# Patient Record
Sex: FEMALE | Race: WHITE | NOT HISPANIC OR LATINO | Employment: FULL TIME | URBAN - METROPOLITAN AREA
[De-identification: names, ages, dates, MRNs, and addresses within clinical notes are randomized per-mention and may not be internally consistent; named-entity substitution may affect disease eponyms.]

---

## 2018-11-14 ENCOUNTER — HOSPITAL ENCOUNTER (EMERGENCY)
Facility: HOSPITAL | Age: 28
Discharge: HOME | End: 2018-11-14
Attending: EMERGENCY MEDICINE
Payer: COMMERCIAL

## 2018-11-14 VITALS
TEMPERATURE: 98 F | OXYGEN SATURATION: 98 % | BODY MASS INDEX: 25.72 KG/M2 | WEIGHT: 139.75 LBS | RESPIRATION RATE: 16 BRPM | SYSTOLIC BLOOD PRESSURE: 129 MMHG | DIASTOLIC BLOOD PRESSURE: 82 MMHG | HEIGHT: 62 IN | HEART RATE: 81 BPM
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
## 2018-11-14 PROCEDURE — 99283 EMERGENCY DEPT VISIT LOW MDM: CPT

## 2018-11-14 RX ORDER — NAPROXEN 375 MG/1
375 TABLET ORAL 2 TIMES DAILY WITH MEALS
Qty: 14 TABLET | Refills: 0 | Status: SHIPPED | OUTPATIENT
Start: 2018-11-14 | End: 2021-11-19
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
## 2022-08-24 ENCOUNTER — OFFICE VISIT (OUTPATIENT)
Dept: URGENT CARE | Facility: CLINIC | Age: 32
End: 2022-08-24
Payer: COMMERCIAL

## 2022-08-24 ENCOUNTER — HOSPITAL ENCOUNTER (OUTPATIENT)
Dept: RADIOLOGY | Facility: CLINIC | Age: 32
Discharge: HOME | End: 2022-08-24
Attending: PHYSICIAN ASSISTANT
Payer: COMMERCIAL

## 2022-08-24 VITALS
OXYGEN SATURATION: 99 % | HEART RATE: 81 BPM | TEMPERATURE: 99 F | SYSTOLIC BLOOD PRESSURE: 116 MMHG | HEIGHT: 62 IN | RESPIRATION RATE: 18 BRPM | WEIGHT: 148 LBS | BODY MASS INDEX: 27.23 KG/M2 | DIASTOLIC BLOOD PRESSURE: 78 MMHG

## 2022-08-24 DIAGNOSIS — K59.01 SLOW TRANSIT CONSTIPATION: ICD-10-CM

## 2022-08-24 DIAGNOSIS — R10.9 ABDOMINAL DISCOMFORT: Primary | ICD-10-CM

## 2022-08-24 LAB
B-HCG UR QL: NEGATIVE
BILIRUB UR QL STRIP: NEGATIVE
CTP QC/QA: YES
GLUCOSE UR QL STRIP: NEGATIVE
KETONES UR QL STRIP: NEGATIVE
LEUKOCYTE ESTERASE UR QL STRIP: NEGATIVE
PH, POC UA: 6
POC BLOOD, URINE: POSITIVE
POC NITRATES, URINE: NEGATIVE
PROT UR QL STRIP: NEGATIVE
SP GR UR STRIP: 1 (ref 1–1.03)
UROBILINOGEN UR STRIP-ACNC: NORMAL (ref 0.1–1.1)

## 2022-08-24 PROCEDURE — 3078F DIAST BP <80 MM HG: CPT | Mod: CPTII,S$GLB,, | Performed by: PHYSICIAN ASSISTANT

## 2022-08-24 PROCEDURE — 3008F BODY MASS INDEX DOCD: CPT | Mod: CPTII,S$GLB,, | Performed by: PHYSICIAN ASSISTANT

## 2022-08-24 PROCEDURE — 1159F MED LIST DOCD IN RCRD: CPT | Mod: CPTII,S$GLB,, | Performed by: PHYSICIAN ASSISTANT

## 2022-08-24 PROCEDURE — 81025 POCT URINE PREGNANCY: ICD-10-PCS | Mod: S$GLB,,, | Performed by: PHYSICIAN ASSISTANT

## 2022-08-24 PROCEDURE — 99214 OFFICE O/P EST MOD 30 MIN: CPT | Mod: S$GLB,,, | Performed by: PHYSICIAN ASSISTANT

## 2022-08-24 PROCEDURE — 1160F PR REVIEW ALL MEDS BY PRESCRIBER/CLIN PHARMACIST DOCUMENTED: ICD-10-PCS | Mod: CPTII,S$GLB,, | Performed by: PHYSICIAN ASSISTANT

## 2022-08-24 PROCEDURE — 3008F PR BODY MASS INDEX (BMI) DOCUMENTED: ICD-10-PCS | Mod: CPTII,S$GLB,, | Performed by: PHYSICIAN ASSISTANT

## 2022-08-24 PROCEDURE — 3074F PR MOST RECENT SYSTOLIC BLOOD PRESSURE < 130 MM HG: ICD-10-PCS | Mod: CPTII,S$GLB,, | Performed by: PHYSICIAN ASSISTANT

## 2022-08-24 PROCEDURE — 81003 URINALYSIS AUTO W/O SCOPE: CPT | Mod: QW,S$GLB,, | Performed by: PHYSICIAN ASSISTANT

## 2022-08-24 PROCEDURE — 1159F PR MEDICATION LIST DOCUMENTED IN MEDICAL RECORD: ICD-10-PCS | Mod: CPTII,S$GLB,, | Performed by: PHYSICIAN ASSISTANT

## 2022-08-24 PROCEDURE — 74019 RADEX ABDOMEN 2 VIEWS: CPT | Mod: S$GLB,,, | Performed by: RADIOLOGY

## 2022-08-24 PROCEDURE — 1160F RVW MEDS BY RX/DR IN RCRD: CPT | Mod: CPTII,S$GLB,, | Performed by: PHYSICIAN ASSISTANT

## 2022-08-24 PROCEDURE — 74019 XR ABDOMEN FLAT AND ERECT: ICD-10-PCS | Mod: S$GLB,,, | Performed by: RADIOLOGY

## 2022-08-24 PROCEDURE — 3078F PR MOST RECENT DIASTOLIC BLOOD PRESSURE < 80 MM HG: ICD-10-PCS | Mod: CPTII,S$GLB,, | Performed by: PHYSICIAN ASSISTANT

## 2022-08-24 PROCEDURE — 3074F SYST BP LT 130 MM HG: CPT | Mod: CPTII,S$GLB,, | Performed by: PHYSICIAN ASSISTANT

## 2022-08-24 PROCEDURE — 81003 POCT URINALYSIS, DIPSTICK, AUTOMATED, W/O SCOPE: ICD-10-PCS | Mod: QW,S$GLB,, | Performed by: PHYSICIAN ASSISTANT

## 2022-08-24 PROCEDURE — 81025 URINE PREGNANCY TEST: CPT | Mod: S$GLB,,, | Performed by: PHYSICIAN ASSISTANT

## 2022-08-24 PROCEDURE — 99214 PR OFFICE/OUTPT VISIT, EST, LEVL IV, 30-39 MIN: ICD-10-PCS | Mod: S$GLB,,, | Performed by: PHYSICIAN ASSISTANT

## 2022-08-24 NOTE — PROGRESS NOTES
"Subjective:       Patient ID: Bessy Geller is a 32 y.o. female.    Vitals:  height is 5' 2" (1.575 m) and weight is 67.1 kg (148 lb). Her tympanic temperature is 98.7 °F (37.1 °C). Her blood pressure is 116/78 and her pulse is 81. Her respiration is 18 and oxygen saturation is 99%.     Chief Complaint: Abdominal Pain    Patient C/o soreness on right side of navel     Other  This is a new problem. Episode onset: saturday  The problem has been unchanged. Associated symptoms include abdominal pain. Pertinent negatives include no anorexia, arthralgias, change in bowel habit, chest pain, chills, congestion, coughing, diaphoresis, fatigue, fever, headaches, joint swelling, myalgias, nausea, neck pain, numbness, rash, sore throat, swollen glands, urinary symptoms, vertigo, visual change, vomiting or weakness. Nothing aggravates the symptoms. She has tried nothing for the symptoms. The treatment provided no relief.       Constitution: Negative for chills, sweating, fatigue and fever.   HENT: Negative for congestion and sore throat.    Neck: Negative for neck pain.   Cardiovascular: Negative for chest pain.   Respiratory: Negative for cough.    Gastrointestinal: Positive for abdominal pain. Negative for nausea and vomiting.   Musculoskeletal: Negative for joint pain, joint swelling and muscle ache.   Skin: Negative for rash.   Neurological: Negative for history of vertigo, headaches and numbness.       Objective:       Vitals:    08/24/22 1243   BP: 116/78   Pulse: 81   Resp: 18   Temp: 98.7 °F (37.1 °C)   TempSrc: Tympanic   SpO2: 99%   Weight: 67.1 kg (148 lb)   Height: 5' 2" (1.575 m)       Physical Exam   Constitutional: She is oriented to person, place, and time. She appears well-developed.   HENT:   Head: Normocephalic and atraumatic.   Ears:   Right Ear: External ear normal.   Left Ear: External ear normal.   Nose: Nose normal.   Mouth/Throat: Mucous membranes are normal.   Eyes: Conjunctivae and lids are " normal.   Neck: Trachea normal. Neck supple.   Cardiovascular: Normal rate, regular rhythm and normal heart sounds.   Pulmonary/Chest: Effort normal and breath sounds normal. No respiratory distress.   Abdominal: Normal appearance and bowel sounds are normal. She exhibits no distension, no fluid wave, no abdominal bruit, no pulsatile midline mass and no mass. Soft. No signs of injury. There is abdominal tenderness in the periumbilical area. There is no left CVA tenderness and no right CVA tenderness. No hernia.     Musculoskeletal: Normal range of motion.         General: Normal range of motion.   Neurological: She is alert and oriented to person, place, and time. She has normal strength.   Skin: Skin is warm, dry, intact, not diaphoretic and not pale.   Psychiatric: Her speech is normal and behavior is normal. Judgment and thought content normal.   Nursing note and vitals reviewed.        Assessment:       1. Abdominal discomfort    2. Slow transit constipation        Results for orders placed or performed in visit on 08/24/22   POCT Urinalysis, Dipstick, Automated, W/O Scope   Result Value Ref Range    POC Blood, Urine Positive (A) Negative    POC Bilirubin, Urine Negative Negative    POC Urobilinogen, Urine Normal 0.1 - 1.1    POC Ketones, Urine Negative Negative    POC Protein, Urine Negative Negative    POC Nitrates, Urine Negative Negative    POC Glucose, Urine Negative Negative    pH, UA 6.0     POC Specific Gravity, Urine 1.005 1.003 - 1.029    POC Leukocytes, Urine Negative Negative   POCT urine pregnancy   Result Value Ref Range    POC Preg Test, Ur Negative Negative     Acceptable Yes      X-Ray Abdomen Flat And Erect    Result Date: 8/24/2022  EXAMINATION: XR ABDOMEN FLAT AND ERECT CLINICAL HISTORY: periumbilical pain to the right;Unspecified abdominal pain TECHNIQUE: Flat and erect AP views of the abdomen were performed. COMPARISON: None FINDINGS: Bowel gas pattern is nonobstructive.   Moderate colonic fecal material present.  No suspicious mass-effect or calcifications present.  Osseous structures demonstrate no acute abnormality.  Mild scoliosis.  Bilateral os acetabuli. Lung bases clear.  IUD present     Constipation Electronically signed by: Brayan Baltazar MD Date:    08/24/2022 Time:    13:53    Plan:         Abdominal discomfort  -     POCT Urinalysis, Dipstick, Automated, W/O Scope  -     POCT urine pregnancy  -     X-Ray Abdomen Flat And Erect; Future; Expected date: 08/24/2022    Slow transit constipation                 Patient Instructions   Abdominal x-ray:Bowel gas pattern is nonobstructive.  Moderate colonic fecal material present.  No suspicious mass-effect or calcifications present.  Osseous structures demonstrate no acute abnormality.  Mild scoliosis. Lung bases clear.  IUD present       CONSTIPATION TIPS:    - you can use the phosphates enema and bisacodyl suppository (laxatives) as directed 1st to clear any blockage from underneath.    - After you having good bowel movement, you can use over-the-counter liquid magnesium citrate (laxative) if needed for continued constipation.    - You can also take polyethylene glycol (david lax; stool softener) daily as directed to help with constipation. This will help to regulate bowel pattern.    - Make sure to drink lots of water while taking any of the above medications.                      Gas and Bloating  Although the mention of intestinal gas problems, such as belching, flatulence, bloating and .gas pains. often elicits some degree of amusement, all of us have gas in our  intestinal tract and must expel it in some way. Some individuals are very sensitive to the effects of gas collections in the stomach and intestinal tract and may develop  significant discomfort. If such complaints are troublesome and persistent and do not respond to simple measures, such as change in diet, a visit to your doctor could be helpful.  Where does the  gas that we belch  or burp come from?  The gas brought back by belching comes entirely from  swallowed air. We all swallow some air when eating food and  drinking liquids. Most of the gas mixes with the stomach  content and either enters into the small intestine or is  belched back. The air that enters the small intestine is either  absorbed or it may continue through to the large intestine  and is then passed rectally. Individuals may swallow more air  (and thus increase stomach gas) if they have a post-nasal  drip, chew gum, have poorly fitting dentures, suck on hard  candies or smoke tobacco. Drinking carbonated beverages  (soda or beer) or eating rapidly can also increase stomach  gas.  What causes repetitive belching or burping?  Some people have episodes of repeated belching. Since  belched gas comes from swallowed air, these individuals are  usually unaware that they caused the problem by swallowing  air into the esophagus and bringing it back rapidly. Often,  the habit can be broken if the person is made aware of the air  swallowing behavior.  What foods cause increased flatus  passage?  The food we choose to eat can influence the amount of  gas passed rectally. Although most of our food intake is  absorbed in the small intestine, some foods, such as  cauliflower, broccoli, cabbage, baked beans, and bran are  incompletely digested. They are then broken down by bacteria in the large intestine, causing the formation of gas. A high roughage diet is important to promote bowel  regularity, but excessive roughage or fiber may lead to  bloating and increased flatulence. When increasing the  amount of fiber in your diet, do so gradually, allowing your  intestinal tract time to adjust. Milk sugar (lactose) requires an intestinal enzyme  (lactase) for digestion. When individuals lack this enzyme  the lactose in milk and other dairy products enters the large  intestine where the lactose is broken down by  bacteria,  producing gas. Although milk is an excellent source of  protein and calcium, many adults experience abdominal  bloating, gas and diarrhea after consuming milk sugar.  Persons from [place] are often extremely intolerant  to the smallest quantity of dairy products.  Everyone passes some rectal gas, although the volume  of gas is different each day. Much of the flatus comes from  the nitrogen found in the air one swallows. The remainder  of the flatus volume is the result of carbohydrates which are  not absorbed in the small intestine and are broken down by  bacteria in the large intestine. Therefore, the amount of  flatus represents a combination of swallowed air and poorly  absorbed carbohydrates. The unpleasant order of flatus is  due to other gases, such as hydrogen sulfide, which is  produced by the bacteria.  How can the volume of flatus be reduced?  In addition to the gas-forming foods cited above, some  diet chewing gum and hard candies use sorbitol or fructose  as sweeteners. These sugars can lead to excess gas  production and should be avoided. Elimination of dairy  products or the use of lactase-added milk can be helpful for  those with lactase deficiency.  Where do I feel gas pains?  Individuals with irritable bowel problems (crampy pain  and/or bowel irregularity) are often sensitive to excess  intestinal gas. A common symptom is generalized  abdominal cramping, sometimes relieved by passing flatus.  If the gas accumulates in the right upper abdomen, the pain  may radiate up intconfused with gallbladder disease. If the gas accumulates in  the left upper abdomen, the pain may radiate into the left side  of the chest and could mimic heart disease. If gas  accumulates in the stomach, the upper abdominal pressure  pain could radiate up to the lower chest and raise concern  about a heart disorder.  Is there treatment for gas pains?  Your physician may wish to obtain tests to be  confident  that recurrent .gas pains. are not the result of some other  disorder. If the tests are normal, a diet designed to reduce  both air swallowing and the ingestion of gas forming foods  would be helpful. Anti-spasmodic medications may relieve  crampy discomfort, but these can have side effects on the  eyes, plus bladder and bowel function.  What causes abdominal distension (bloating)?  Many individuals complain of abdominal distension  which increases during the day and is most uncomfortable  after the evening meal. Often distension is believed to be  caused by the build-up of intestinal gas; however, there are  other considerations. The tone of the rectus muscles (the  muscles which support the abdominal wall and run along the  length of the abdomen on either side of the navel) may be  decreased due to the stretching of the abdominal wall in  women who have had one or more pregnancies. If these  muscles have become thinned, the abdomen may distend as  food (and gas) moves through the intestine. This is most  noticed after the evening meal. This explanation for  distension (bloating) is most likely if the uncomfortable  feeling is absent when the individual is lying down (you don.t  need the rectus muscle for a .flat. abdomen when lying  down) but is apparent when standing or sitting erect. There  is no effective medical therapy for this type of abdominal  bloating but exercise directed toward strengthening the  abdominal muscles may be helpful, particularly in younger  women.  When should individuals with gaseous symptoms consult a  Physician?  Individuals with a long history of occasional  gaseousness and abdominal discomfort need not seek  medical attention. A change in the location of abdominal  pain, significant increase in the frequency or severity of  symptoms, or onset of new symptoms in individuals over the  age of 40 are some of the reasons to see your doctor.o the right lower chest and could be    What over-the-counter drugs provide relief for gaseous  symptoms?  Despite the many commercials and advertiseme  medications which reduce gas pains and bloating, very few  have any proven scientific value. Simethicone, a common  additive to antacid preparations, shows some benefits when  being tested in a lab, but many individuals feel little relief.  Several scientific studies have found some benefit from  activated charcoal preparations in gassy or flatulent  individuals, but other studies have failed to show symptom  Improvement.  10 Steps to Decrease Symptoms of Intestinal Gas  1. Develop a regular routine of diet, exercise, and rest.  2. Correct bad habits:  Chew food thoroughly  Eat slowly and leisurely in a quiet atmosphere  Avoid washing solids down with a beverage  Avoid gulping and sipping liquids  Avoid drinking out of small mouthed bottles or straws  Avoid drinking from water fountains  Avoid carbonated beverages.sodas and beer  Eliminate pipe, cigar, and cigarette smoking  Avoid gum chewing and sucking hard candy  Check dentures for proper fit  Attempt to be aware of and avoid deep sighing  3. Do not attempt to induce belching.  4. Do not overload the stomach at any one meal.  5. Avoid gaseous vegetables: navy beans, cabbage, brussel  sprouts, cauliflower, broccoli, turnips, cucumbers, radishes,  onions, melons, and excess raw fruit and vegetables.  6. Avoid foods with air whipped into them.souffles, sponge cake,  milk shakes.  7. Avoid long-term or frequent intermittent use of medications  intended for relief of cold symptoms. cough, nasal congestion,  post nasal discharge.  8. Avoid tight fitting garments, girdles, and belts.  9. Do not lie down or sit in a slumped position immediately after  eating.  10. Take a leisurely stroll after meals.      - You must understand that you have received an Urgent Care treatment only and that you may be released before all of your medical problems are known or  treated.   - You, the patient, will arrange for follow up care as instructed with your primary care provider or recommended specialist.   - If your condition worsens or fails to improve we recommend that you receive another evaluation at the ER immediately or contact your PCP to discuss your concerns, or return here.   - Please do not drive or make any important decisions for 24 hours if you have received any pain medications, sedatives or mood altering drugs during your visit.    Disclaimer: This document was drafted with the use of a voice recognition device and is likely to have sound alike errors.

## 2022-08-24 NOTE — PATIENT INSTRUCTIONS
Abdominal x-ray:Bowel gas pattern is nonobstructive.  Moderate colonic fecal material present.  No suspicious mass-effect or calcifications present.  Osseous structures demonstrate no acute abnormality.  Mild scoliosis. Lung bases clear.  IUD present       CONSTIPATION TIPS:    - you can use the phosphates enema and bisacodyl suppository (laxatives) as directed 1st to clear any blockage from underneath.    - After you having good bowel movement, you can use over-the-counter liquid magnesium citrate (laxative) if needed for continued constipation.    - You can also take polyethylene glycol (david lax; stool softener) daily as directed to help with constipation. This will help to regulate bowel pattern.    - Make sure to drink lots of water while taking any of the above medications.                      Gas and Bloating  Although the mention of intestinal gas problems, such as belching, flatulence, bloating and .gas pains. often elicits some degree of amusement, all of us have gas in our  intestinal tract and must expel it in some way. Some individuals are very sensitive to the effects of gas collections in the stomach and intestinal tract and may develop  significant discomfort. If such complaints are troublesome and persistent and do not respond to simple measures, such as change in diet, a visit to your doctor could be helpful.  Where does the gas that we belch  or burp come from?  The gas brought back by belching comes entirely from  swallowed air. We all swallow some air when eating food and  drinking liquids. Most of the gas mixes with the stomach  content and either enters into the small intestine or is  belched back. The air that enters the small intestine is either  absorbed or it may continue through to the large intestine  and is then passed rectally. Individuals may swallow more air  (and thus increase stomach gas) if they have a post-nasal  drip, chew gum, have poorly fitting dentures, suck on  hard  candies or smoke tobacco. Drinking carbonated beverages  (soda or beer) or eating rapidly can also increase stomach  gas.  What causes repetitive belching or burping?  Some people have episodes of repeated belching. Since  belched gas comes from swallowed air, these individuals are  usually unaware that they caused the problem by swallowing  air into the esophagus and bringing it back rapidly. Often,  the habit can be broken if the person is made aware of the air  swallowing behavior.  What foods cause increased flatus  passage?  The food we choose to eat can influence the amount of  gas passed rectally. Although most of our food intake is  absorbed in the small intestine, some foods, such as  cauliflower, broccoli, cabbage, baked beans, and bran are  incompletely digested. They are then broken down by bacteria in the large intestine, causing the formation of gas. A high roughage diet is important to promote bowel  regularity, but excessive roughage or fiber may lead to  bloating and increased flatulence. When increasing the  amount of fiber in your diet, do so gradually, allowing your  intestinal tract time to adjust. Milk sugar (lactose) requires an intestinal enzyme  (lactase) for digestion. When individuals lack this enzyme  the lactose in milk and other dairy products enters the large  intestine where the lactose is broken down by bacteria,  producing gas. Although milk is an excellent source of  protein and calcium, many adults experience abdominal  bloating, gas and diarrhea after consuming milk sugar.  Persons from [place] are often extremely intolerant  to the smallest quantity of dairy products.  Everyone passes some rectal gas, although the volume  of gas is different each day. Much of the flatus comes from  the nitrogen found in the air one swallows. The remainder  of the flatus volume is the result of carbohydrates which are  not absorbed in the small intestine and are broken down  by  bacteria in the large intestine. Therefore, the amount of  flatus represents a combination of swallowed air and poorly  absorbed carbohydrates. The unpleasant order of flatus is  due to other gases, such as hydrogen sulfide, which is  produced by the bacteria.  How can the volume of flatus be reduced?  In addition to the gas-forming foods cited above, some  diet chewing gum and hard candies use sorbitol or fructose  as sweeteners. These sugars can lead to excess gas  production and should be avoided. Elimination of dairy  products or the use of lactase-added milk can be helpful for  those with lactase deficiency.  Where do I feel gas pains?  Individuals with irritable bowel problems (crampy pain  and/or bowel irregularity) are often sensitive to excess  intestinal gas. A common symptom is generalized  abdominal cramping, sometimes relieved by passing flatus.  If the gas accumulates in the right upper abdomen, the pain  may radiate up intconfused with gallbladder disease. If the gas accumulates in  the left upper abdomen, the pain may radiate into the left side  of the chest and could mimic heart disease. If gas  accumulates in the stomach, the upper abdominal pressure  pain could radiate up to the lower chest and raise concern  about a heart disorder.  Is there treatment for gas pains?  Your physician may wish to obtain tests to be confident  that recurrent .gas pains. are not the result of some other  disorder. If the tests are normal, a diet designed to reduce  both air swallowing and the ingestion of gas forming foods  would be helpful. Anti-spasmodic medications may relieve  crampy discomfort, but these can have side effects on the  eyes, plus bladder and bowel function.  What causes abdominal distension (bloating)?  Many individuals complain of abdominal distension  which increases during the day and is most uncomfortable  after the evening meal. Often distension is believed to be  caused by the build-up of  intestinal gas; however, there are  other considerations. The tone of the rectus muscles (the  muscles which support the abdominal wall and run along the  length of the abdomen on either side of the navel) may be  decreased due to the stretching of the abdominal wall in  women who have had one or more pregnancies. If these  muscles have become thinned, the abdomen may distend as  food (and gas) moves through the intestine. This is most  noticed after the evening meal. This explanation for  distension (bloating) is most likely if the uncomfortable  feeling is absent when the individual is lying down (you don.t  need the rectus muscle for a .flat. abdomen when lying  down) but is apparent when standing or sitting erect. There  is no effective medical therapy for this type of abdominal  bloating but exercise directed toward strengthening the  abdominal muscles may be helpful, particularly in younger  women.  When should individuals with gaseous symptoms consult a  Physician?  Individuals with a long history of occasional  gaseousness and abdominal discomfort need not seek  medical attention. A change in the location of abdominal  pain, significant increase in the frequency or severity of  symptoms, or onset of new symptoms in individuals over the  age of 40 are some of the reasons to see your doctor.o the right lower chest and could be   What over-the-counter drugs provide relief for gaseous  symptoms?  Despite the many commercials and advertiseme  medications which reduce gas pains and bloating, very few  have any proven scientific value. Simethicone, a common  additive to antacid preparations, shows some benefits when  being tested in a lab, but many individuals feel little relief.  Several scientific studies have found some benefit from  activated charcoal preparations in gassy or flatulent  individuals, but other studies have failed to show symptom  Improvement.  10 Steps to Decrease Symptoms of Intestinal Gas  1.  Develop a regular routine of diet, exercise, and rest.  2. Correct bad habits:  Chew food thoroughly  Eat slowly and leisurely in a quiet atmosphere  Avoid washing solids down with a beverage  Avoid gulping and sipping liquids  Avoid drinking out of small mouthed bottles or straws  Avoid drinking from water fountains  Avoid carbonated beverages.sodas and beer  Eliminate pipe, cigar, and cigarette smoking  Avoid gum chewing and sucking hard candy  Check dentures for proper fit  Attempt to be aware of and avoid deep sighing  3. Do not attempt to induce belching.  4. Do not overload the stomach at any one meal.  5. Avoid gaseous vegetables: navy beans, cabbage, brussel  sprouts, cauliflower, broccoli, turnips, cucumbers, radishes,  onions, melons, and excess raw fruit and vegetables.  6. Avoid foods with air whipped into them.souffles, sponge cake,  milk shakes.  7. Avoid long-term or frequent intermittent use of medications  intended for relief of cold symptoms. cough, nasal congestion,  post nasal discharge.  8. Avoid tight fitting garments, girdles, and belts.  9. Do not lie down or sit in a slumped position immediately after  eating.  10. Take a leisurely stroll after meals.      - You must understand that you have received an Urgent Care treatment only and that you may be released before all of your medical problems are known or treated.   - You, the patient, will arrange for follow up care as instructed with your primary care provider or recommended specialist.   - If your condition worsens or fails to improve we recommend that you receive another evaluation at the ER immediately or contact your PCP to discuss your concerns, or return here.   - Please do not drive or make any important decisions for 24 hours if you have received any pain medications, sedatives or mood altering drugs during your visit.    Disclaimer: This document was drafted with the use of a voice recognition device and is likely to have sound  alike errors.

## 2022-08-24 NOTE — LETTER
August 24, 2022      [facility]  [address], [address]  [place] [address]  Phone: [phone]       Patient: Bessy Geller   YOB: 1990  Date of Visit: 08/24/2022    To Whom It May Concern:    Moraima Geller  was at [facility] on 08/24/2022. The patient may return to work/school on today with no restrictions. If you have any questions or concerns, or if I can be of further assistance, please do not hesitate to contact me.    Sincerely,    Eileen Trejo PA-C

## 2022-09-19 ENCOUNTER — OFFICE VISIT (OUTPATIENT)
Dept: URGENT CARE | Facility: CLINIC | Age: 32
End: 2022-09-19
Payer: COMMERCIAL

## 2022-09-19 VITALS
RESPIRATION RATE: 16 BRPM | BODY MASS INDEX: 27.6 KG/M2 | DIASTOLIC BLOOD PRESSURE: 78 MMHG | HEIGHT: 62 IN | WEIGHT: 150 LBS | SYSTOLIC BLOOD PRESSURE: 132 MMHG | HEART RATE: 76 BPM | OXYGEN SATURATION: 99 % | TEMPERATURE: 98 F

## 2022-09-19 DIAGNOSIS — R35.0 URINARY FREQUENCY: Primary | ICD-10-CM

## 2022-09-19 DIAGNOSIS — R30.0 DYSURIA: ICD-10-CM

## 2022-09-19 LAB
BILIRUB UR QL STRIP: NEGATIVE
GLUCOSE UR QL STRIP: NEGATIVE
KETONES UR QL STRIP: NEGATIVE
LEUKOCYTE ESTERASE UR QL STRIP: NEGATIVE
PH, POC UA: 8
POC BLOOD, URINE: POSITIVE
POC NITRATES, URINE: NEGATIVE
PROT UR QL STRIP: NEGATIVE
SP GR UR STRIP: 1 (ref 1–1.03)
UROBILINOGEN UR STRIP-ACNC: ABNORMAL (ref 0.1–1.1)

## 2022-09-19 PROCEDURE — 3008F BODY MASS INDEX DOCD: CPT | Mod: CPTII,S$GLB,, | Performed by: PHYSICIAN ASSISTANT

## 2022-09-19 PROCEDURE — 1160F RVW MEDS BY RX/DR IN RCRD: CPT | Mod: CPTII,S$GLB,, | Performed by: PHYSICIAN ASSISTANT

## 2022-09-19 PROCEDURE — 1159F PR MEDICATION LIST DOCUMENTED IN MEDICAL RECORD: ICD-10-PCS | Mod: CPTII,S$GLB,, | Performed by: PHYSICIAN ASSISTANT

## 2022-09-19 PROCEDURE — 1159F MED LIST DOCD IN RCRD: CPT | Mod: CPTII,S$GLB,, | Performed by: PHYSICIAN ASSISTANT

## 2022-09-19 PROCEDURE — 99213 PR OFFICE/OUTPT VISIT, EST, LEVL III, 20-29 MIN: ICD-10-PCS | Mod: S$GLB,,, | Performed by: PHYSICIAN ASSISTANT

## 2022-09-19 PROCEDURE — 81003 URINALYSIS AUTO W/O SCOPE: CPT | Mod: QW,S$GLB,, | Performed by: PHYSICIAN ASSISTANT

## 2022-09-19 PROCEDURE — 3075F SYST BP GE 130 - 139MM HG: CPT | Mod: CPTII,S$GLB,, | Performed by: PHYSICIAN ASSISTANT

## 2022-09-19 PROCEDURE — 81003 POCT URINALYSIS, DIPSTICK, AUTOMATED, W/O SCOPE: ICD-10-PCS | Mod: QW,S$GLB,, | Performed by: PHYSICIAN ASSISTANT

## 2022-09-19 PROCEDURE — 3078F PR MOST RECENT DIASTOLIC BLOOD PRESSURE < 80 MM HG: ICD-10-PCS | Mod: CPTII,S$GLB,, | Performed by: PHYSICIAN ASSISTANT

## 2022-09-19 PROCEDURE — 99213 OFFICE O/P EST LOW 20 MIN: CPT | Mod: S$GLB,,, | Performed by: PHYSICIAN ASSISTANT

## 2022-09-19 PROCEDURE — 3075F PR MOST RECENT SYSTOLIC BLOOD PRESS GE 130-139MM HG: ICD-10-PCS | Mod: CPTII,S$GLB,, | Performed by: PHYSICIAN ASSISTANT

## 2022-09-19 PROCEDURE — 3078F DIAST BP <80 MM HG: CPT | Mod: CPTII,S$GLB,, | Performed by: PHYSICIAN ASSISTANT

## 2022-09-19 PROCEDURE — 3008F PR BODY MASS INDEX (BMI) DOCUMENTED: ICD-10-PCS | Mod: CPTII,S$GLB,, | Performed by: PHYSICIAN ASSISTANT

## 2022-09-19 PROCEDURE — 1160F PR REVIEW ALL MEDS BY PRESCRIBER/CLIN PHARMACIST DOCUMENTED: ICD-10-PCS | Mod: CPTII,S$GLB,, | Performed by: PHYSICIAN ASSISTANT

## 2022-09-19 PROCEDURE — 87086 URINE CULTURE/COLONY COUNT: CPT | Performed by: PHYSICIAN ASSISTANT

## 2022-09-19 RX ORDER — GALCANEZUMAB 120 MG/ML
INJECTION, SOLUTION SUBCUTANEOUS
COMMUNITY
Start: 2022-09-12 | End: 2022-12-02

## 2022-09-19 NOTE — PROGRESS NOTES
"Subjective:       Patient ID: Bessy Geller is a 32 y.o. female.    Vitals:  height is 5' 2" (1.575 m) and weight is 68 kg (150 lb). Her tympanic temperature is 98.3 °F (36.8 °C). Her blood pressure is 132/78 and her pulse is 76. Her respiration is 16 and oxygen saturation is 99%.     Chief Complaint: Urinary Tract Infection    Pt c/o UTI symptoms.  Virtual visit-prescribed Ciprofloxacin which she finished on Saturday.  Pt c/o urinary frequency, dysuria, pressure, and urinary hesitancy that have continued.  Denies fever, N/V, flank pain, or abdominal pain.  Patient currently on her menstrual cycle.    Urinary Tract Infection   This is a new problem. The current episode started 1 to 4 weeks ago (2 weeks). The problem has been gradually worsening. The quality of the pain is described as burning and aching (pressure). The pain is at a severity of 5/10. The pain is moderate. There has been no fever. She is Sexually active. There is No history of pyelonephritis. Associated symptoms include frequency, hesitancy and urgency. Pertinent negatives include no behavior changes, chills, discharge, flank pain, hematuria, nausea, possible pregnancy, sweats, vomiting, weight loss, bubble bath use, constipation, rash or withholding. She has tried antibiotics for the symptoms. The treatment provided no relief. There is no history of catheterization, diabetes insipidus, diabetes mellitus, genitourinary reflux, hypertension, kidney stones, recurrent UTIs, a single kidney, STD, urinary stasis or a urological procedure.     Constitution: Negative for chills.   Gastrointestinal:  Negative for nausea, vomiting and constipation.   Genitourinary:  Positive for frequency and urgency. Negative for flank pain and hematuria.   Skin:  Negative for rash.     Objective:      Physical Exam   Constitutional: She is oriented to person, place, and time. She appears well-developed.   HENT:   Head: Normocephalic and atraumatic.   Ears:   Right " Ear: External ear normal.   Left Ear: External ear normal.   Mouth/Throat: Mucous membranes are normal.   Eyes: Conjunctivae and lids are normal.   Neck: Trachea normal. Neck supple.   Cardiovascular: Normal rate, regular rhythm and normal heart sounds.   Pulmonary/Chest: Effort normal and breath sounds normal. No respiratory distress.   Abdominal: Normal appearance and bowel sounds are normal. She exhibits no distension and no mass. Soft. flat abdomen There is no abdominal tenderness. There is no left CVA tenderness and no right CVA tenderness.   Musculoskeletal: Normal range of motion.         General: Normal range of motion.   Neurological: She is alert and oriented to person, place, and time. She has normal strength.   Skin: Skin is warm, dry, intact, not diaphoretic and not pale.   Psychiatric: Her speech is normal and behavior is normal. Judgment and thought content normal.   Nursing note and vitals reviewed.      Assessment:       1. Urinary frequency    2. Dysuria          Plan:         Urinary frequency  -     POCT Urinalysis, Dipstick, Automated, W/O Scope  -     Urine culture    Dysuria  -     Urine culture       Results for orders placed or performed in visit on 09/19/22   POCT Urinalysis, Dipstick, Automated, W/O Scope   Result Value Ref Range    POC Blood, Urine Positive (A) Negative    POC Bilirubin, Urine Negative Negative    POC Urobilinogen, Urine norm 0.1 - 1.1    POC Ketones, Urine Negative Negative    POC Protein, Urine Negative Negative    POC Nitrates, Urine Negative Negative    POC Glucose, Urine Negative Negative    pH, UA 8.0     POC Specific Gravity, Urine 1.005 (A) 1.003 - 1.029    POC Leukocytes, Urine Negative Negative       Will send urine for culture.  WE will call you with results and advise if you need to start antibiotics at that time.  Drink plenty of fluid.  Good hygiene practices encouraged.  Avoid citrus fruits.  RTC or go to the ER with new or worsening symptoms.

## 2022-09-21 ENCOUNTER — TELEPHONE (OUTPATIENT)
Dept: URGENT CARE | Facility: CLINIC | Age: 32
End: 2022-09-21
Payer: COMMERCIAL

## 2022-09-21 LAB — BACTERIA UR CULT: NO GROWTH

## 2022-09-21 NOTE — TELEPHONE ENCOUNTER
Patient notified of results below and no need for antibiotics at this time.    Reminded to follow-up with PCP, urology, or OBGYN as needed.    Results for orders placed or performed in visit on 09/19/22   Urine culture    Specimen: Urine, Clean Catch   Result Value Ref Range    Urine Culture, Routine No growth    POCT Urinalysis, Dipstick, Automated, W/O Scope   Result Value Ref Range    POC Blood, Urine Positive (A) Negative    POC Bilirubin, Urine Negative Negative    POC Urobilinogen, Urine norm 0.1 - 1.1    POC Ketones, Urine Negative Negative    POC Protein, Urine Negative Negative    POC Nitrates, Urine Negative Negative    POC Glucose, Urine Negative Negative    pH, UA 8.0     POC Specific Gravity, Urine 1.005 (A) 1.003 - 1.029    POC Leukocytes, Urine Negative Negative

## 2023-08-10 ENCOUNTER — OFFICE VISIT (OUTPATIENT)
Dept: URGENT CARE | Facility: CLINIC | Age: 33
End: 2023-08-10
Payer: COMMERCIAL

## 2023-08-10 VITALS
HEIGHT: 62 IN | OXYGEN SATURATION: 97 % | BODY MASS INDEX: 26.87 KG/M2 | TEMPERATURE: 98 F | HEART RATE: 76 BPM | RESPIRATION RATE: 16 BRPM | DIASTOLIC BLOOD PRESSURE: 76 MMHG | SYSTOLIC BLOOD PRESSURE: 116 MMHG | WEIGHT: 146 LBS

## 2023-08-10 DIAGNOSIS — J02.9 ACUTE PHARYNGITIS, UNSPECIFIED ETIOLOGY: Primary | ICD-10-CM

## 2023-08-10 DIAGNOSIS — M79.672 LEFT FOOT PAIN: ICD-10-CM

## 2023-08-10 PROCEDURE — 99204 PR OFFICE/OUTPT VISIT, NEW, LEVL IV, 45-59 MIN: ICD-10-PCS | Mod: S$GLB,,, | Performed by: NURSE PRACTITIONER

## 2023-08-10 PROCEDURE — 99204 OFFICE O/P NEW MOD 45 MIN: CPT | Mod: S$GLB,,, | Performed by: NURSE PRACTITIONER

## 2023-08-10 RX ORDER — METHYLPREDNISOLONE 4 MG/1
TABLET ORAL
Qty: 21 EACH | Refills: 0 | Status: SHIPPED | OUTPATIENT
Start: 2023-08-10 | End: 2023-08-31

## 2023-08-10 NOTE — PATIENT INSTRUCTIONS
Supportive care for sore throat (salt water gargles, lozenges, chloraseptic spray, cough drops, warm tea, etc.). Continue to monitor for fever. Tylenol or Ibuprofen prn for pain or fever. Increase fluid intake as we discussed.  F/u with PCP or rtc if symptoms worsen or fail to improve.         If you have been discharged from the clinic prior to your point of care test results being completed, please make sure to check your MyChart account.  If there is a change in treatment, we will communicate with you through here.  If your test is positive, and medications are ordered, these will be sent to your preferred pharmacy.   If your test is negative, no further steps needed. If you do not hear from us or have questions, please call the clinic.        - You must understand that you have received an Urgent Care treatment only and that you may be released before all of your medical problems are known or treated.   - You, the patient, will arrange for follow up care as instructed with your primary care provider or recommended specialist.   - If your condition worsens or fails to improve we recommend that you receive another evaluation at the ER immediately or contact your PCP to discuss your concerns, or return here.   - Please do not drive or make any important decisions for 24 hours if you have received any pain medications, sedatives or mood altering drugs during your visit.     Disclaimer: This document was drafted with the use of a voice recognition device and is likely to have sound alike errors.

## 2023-08-10 NOTE — PROGRESS NOTES
"Subjective:      Patient ID: Bessy Geller is a 32 y.o. female.    Vitals:  height is 5' 2.01" (1.575 m) and weight is 66.2 kg (146 lb). Her tympanic temperature is 98.3 °F (36.8 °C). Her blood pressure is 116/76 and her pulse is 76. Her respiration is 16 and oxygen saturation is 97%.     Chief Complaint: Sore Throat    Pt onset of sore throat occurred 1-2 weeks ago. Pt presents with production of mucus( yellow-green) and constant need to "hack"/excrete mucus. Pt has taken Claritin with no relief. Pt has no cough or fever present. Pt also present with shooting/pain on left foot between 4th and 5th digit, and pain is present on top of foot. Pt has past history of fracture in that foot.       States she "I am not really coughing; I just hack up colored (yellow and green) mucus."  States her symptoms improve slightly but not completely  Also complains of left foot pain in her left foot exactly where she broke her foot in the past  State her left foot has been hurting for a while ("a month")  States the pain radiates up her foot and mostly hurts when she is walking  States she has been working out a lot lately in the gym   Denies any recent trauma      Sore Throat   This is a new problem. The current episode started 1 to 4 weeks ago. The problem has been unchanged. There has been no fever. The pain is at a severity of 5/10. The pain is mild. Associated symptoms include congestion. Pertinent negatives include no abdominal pain, coughing, diarrhea, drooling, ear discharge, ear pain, headaches, hoarse voice, plugged ear sensation, neck pain, shortness of breath, stridor, swollen glands, trouble swallowing or vomiting. She has tried NSAIDs for the symptoms. The treatment provided no relief.       Constitution: Negative for chills, sweating, fatigue and fever.   HENT:  Positive for congestion and sore throat. Negative for ear pain, ear discharge, drooling and trouble swallowing.    Neck: Negative for neck pain. "   Respiratory:  Negative for cough, shortness of breath and stridor.    Gastrointestinal:  Negative for abdominal pain, vomiting and diarrhea.   Neurological:  Negative for headaches.      Objective:     Physical Exam   Constitutional: She appears well-developed. She is cooperative.  Non-toxic appearance. She does not appear ill. No distress. normal and well-groomed  HENT:   Head: Normocephalic and atraumatic.   Ears:   Right Ear: External ear normal.   Left Ear: External ear normal.   Nose: Nose normal.   Mouth/Throat: No oropharyngeal exudate, posterior oropharyngeal edema, posterior oropharyngeal erythema (mild), tonsillar abscesses or cobblestoning.   Eyes: Conjunctivae and EOM are normal.   Neck: Neck supple.   Cardiovascular: Normal rate and regular rhythm.   Pulmonary/Chest: Effort normal and breath sounds normal. No stridor. No respiratory distress. She has no wheezes. She has no rhonchi. She has no rales.   Abdominal: Normal appearance.   Musculoskeletal: Normal range of motion.         General: Normal range of motion.        Feet:    Neurological: no focal deficit. She is alert. She displays no weakness. Gait normal.   Skin: Skin is warm, dry, not diaphoretic, not pale and no rash. Capillary refill takes less than 2 seconds.   Psychiatric: She experiences Normal attention and Normal perception. Her speech is normal and behavior is normal. Mood, memory, affect, judgment and thought content normal. Cognition normal  Nursing note and vitals reviewed.      Assessment:     1. Acute pharyngitis, unspecified etiology    2. Left foot pain        Plan:       Acute pharyngitis, unspecified etiology    Left foot pain  -     Ambulatory referral/consult to Podiatry    Other orders  -     methylPREDNISolone (MEDROL DOSEPACK) 4 mg tablet; use as directed  Dispense: 21 each; Refill: 0           Increase hydration as steroid can increase blood glucose  Discussed OTC symptoms to help relieve sore throat  Wear tennis shoes  and change out inserts yearly and prn   If symptoms persists or worsen, RTC, follow up with PCP, or go to the nearest ER  Patient agreed with plan of care and verbalized understanding    Patient Instructions   Supportive care for sore throat (salt water gargles, lozenges, chloraseptic spray, cough drops, warm tea, etc.). Continue to monitor for fever. Tylenol or Ibuprofen prn for pain or fever. Increase fluid intake as we discussed.  F/u with PCP or rtc if symptoms worsen or fail to improve.         If you have been discharged from the clinic prior to your point of care test results being completed, please make sure to check your [facility] account.  If there is a change in treatment, we will communicate with you through here.  If your test is positive, and medications are ordered, these will be sent to your preferred pharmacy.   If your test is negative, no further steps needed. If you do not hear from us or have questions, please call the clinic.        - You must understand that you have received an Urgent Care treatment only and that you may be released before all of your medical problems are known or treated.   - You, the patient, will arrange for follow up care as instructed with your primary care provider or recommended specialist.   - If your condition worsens or fails to improve we recommend that you receive another evaluation at the ER immediately or contact your PCP to discuss your concerns, or return here.   - Please do not drive or make any important decisions for 24 hours if you have received any pain medications, sedatives or mood altering drugs during your visit.     Disclaimer: This document was drafted with the use of a voice recognition device and is likely to have sound alike errors.

## 2023-08-10 NOTE — LETTER
August 10, 2023      [facility]  [address], [address]  [place] [address]  Phone: [phone]       Patient: Bessy Geller   YOB: 1990  Date of Visit: 08/10/2023    To Whom It May Concern:    Moraima Geller  was at [facility] on 08/10/2023. The patient may return to work/school on 08/10/2023 with no restrictions. If you have any questions or concerns, or if I can be of further assistance, please do not hesitate to contact me.    Sincerely,    Samir Rice NP

## 2023-08-13 ENCOUNTER — TELEPHONE (OUTPATIENT)
Dept: URGENT CARE | Facility: CLINIC | Age: 33
End: 2023-08-13
Payer: COMMERCIAL

## 2023-08-13 NOTE — TELEPHONE ENCOUNTER
Made courtesy call to pt,pt states she is feeling better at the time of the call, pt states she also has f/u with podiatry to have her foot evaluated

## 2023-08-14 DIAGNOSIS — M79.672 LEFT FOOT PAIN: Primary | ICD-10-CM

## 2023-08-15 ENCOUNTER — OFFICE VISIT (OUTPATIENT)
Dept: PODIATRY | Facility: CLINIC | Age: 33
End: 2023-08-15
Payer: COMMERCIAL

## 2023-08-15 ENCOUNTER — HOSPITAL ENCOUNTER (OUTPATIENT)
Dept: RADIOLOGY | Facility: HOSPITAL | Age: 33
Discharge: HOME | End: 2023-08-15
Attending: PODIATRIST
Payer: COMMERCIAL

## 2023-08-15 VITALS — WEIGHT: 146 LBS | HEIGHT: 62 IN | BODY MASS INDEX: 26.87 KG/M2

## 2023-08-15 DIAGNOSIS — M79.672 LEFT FOOT PAIN: ICD-10-CM

## 2023-08-15 DIAGNOSIS — M77.52 BURSITIS OF LEFT FOOT: Primary | ICD-10-CM

## 2023-08-15 PROCEDURE — 20600 PR DRAIN/INJECT SMALL JOINT/BURSA: ICD-10-PCS | Mod: LT,S$GLB,, | Performed by: PODIATRIST

## 2023-08-15 PROCEDURE — 99999 PR PBB SHADOW E&M-EST. PATIENT-LVL III: ICD-10-PCS | Mod: PBBFAC,,, | Performed by: PODIATRIST

## 2023-08-15 PROCEDURE — 73630 X-RAY EXAM OF FOOT: CPT | Mod: 26,LT,, | Performed by: RADIOLOGY

## 2023-08-15 PROCEDURE — 1159F MED LIST DOCD IN RCRD: CPT | Mod: CPTII,S$GLB,, | Performed by: PODIATRIST

## 2023-08-15 PROCEDURE — 99204 OFFICE O/P NEW MOD 45 MIN: CPT | Mod: 25,S$GLB,, | Performed by: PODIATRIST

## 2023-08-15 PROCEDURE — 3008F BODY MASS INDEX DOCD: CPT | Mod: CPTII,S$GLB,, | Performed by: PODIATRIST

## 2023-08-15 PROCEDURE — 3008F PR BODY MASS INDEX (BMI) DOCUMENTED: ICD-10-PCS | Mod: CPTII,S$GLB,, | Performed by: PODIATRIST

## 2023-08-15 PROCEDURE — 1159F PR MEDICATION LIST DOCUMENTED IN MEDICAL RECORD: ICD-10-PCS | Mod: CPTII,S$GLB,, | Performed by: PODIATRIST

## 2023-08-15 PROCEDURE — 73630 XR FOOT COMPLETE 3 VIEW LEFT: ICD-10-PCS | Mod: 26,LT,, | Performed by: RADIOLOGY

## 2023-08-15 PROCEDURE — 73630 X-RAY EXAM OF FOOT: CPT | Mod: TC,FY,PO,LT

## 2023-08-15 PROCEDURE — 99204 PR OFFICE/OUTPT VISIT, NEW, LEVL IV, 45-59 MIN: ICD-10-PCS | Mod: 25,S$GLB,, | Performed by: PODIATRIST

## 2023-08-15 PROCEDURE — 99999 PR PBB SHADOW E&M-EST. PATIENT-LVL III: CPT | Mod: PBBFAC,,, | Performed by: PODIATRIST

## 2023-08-15 PROCEDURE — 20600 DRAIN/INJ JOINT/BURSA W/O US: CPT | Mod: LT,S$GLB,, | Performed by: PODIATRIST

## 2023-08-15 RX ORDER — TRIAMCINOLONE ACETONIDE 40 MG/ML
20 INJECTION, SUSPENSION INTRA-ARTICULAR; INTRAMUSCULAR ONCE
Status: COMPLETED | OUTPATIENT
Start: 2023-08-15 | End: 2023-08-15

## 2023-08-15 RX ADMIN — TRIAMCINOLONE ACETONIDE 20 MG: 40 INJECTION, SUSPENSION INTRA-ARTICULAR; INTRAMUSCULAR at 06:08

## 2023-08-15 NOTE — LETTER
August 15, 2023      [facility]  [address] [place] [address]  Phone: [phone]       Patient: Bessy Geller   YOB: 1990  Date of Visit: 08/15/2023    To Whom It May Concern:    Moraima Geller  was at [facility] on 08/15/2023. The patient may return to work on 08/15/2023 with no restrictions. If you have any questions or concerns, or if I can be of further assistance, please do not hesitate to contact me.    Sincerely,      Lisbet Alex MA

## 2023-08-15 NOTE — PROGRESS NOTES
[facility] -   PODIATRIC MEDICINE AND SURGERY  PROGRESS NOTE    Reason for Visit   Chief Complaint   Patient presents with    Foot Pain     C/o left foot pain, lateral aspect, rates pain 4/10, prev injury several years ago, poss re-injury, non-diabetic, wears casual shoes       HPI  Bessy Geller is a 32 y.o. female  who presents today after sustaining injury to right fifth digit. Pt states she did have a fracture of toe last year but states now she has achy pains to right foot along fifth metatarsal/toe.     Patient denies other pedal complaints at this time.      PMH  Past Medical History:   Diagnosis Date    Amenorrhea     Migraines     PCOS (polycystic ovarian syndrome)        MEDS  Current Outpatient Medications on File Prior to Visit   Medication Sig Dispense Refill    butalbital-acetaminophen-caff -40 mg Cap       ibuprofen (ADVIL,MOTRIN) 600 MG tablet Take 600 mg by mouth every 6 (six) hours as needed.      medroxyPROGESTERone (PROVERA) 10 MG tablet TAKE 1 TABLET(10 MG) BY MOUTH EVERY DAY FOR 14 DAYS 14 tablet 0    methylPREDNISolone (MEDROL DOSEPACK) 4 mg tablet use as directed 21 each 0    naproxen sodium (ANAPROX) 550 MG tablet Take 1 tablet (550 mg total) by mouth 3 (three) times daily with meals. 30 tablet 2     No current facility-administered medications on file prior to visit.       PSH     Past Surgical History:   Procedure Laterality Date    REMOVAL OF INTRAUTERINE DEVICE (IUD)  12/28/2022    iud - unable to be removed during ultrasound        ALL  Review of patient's allergies indicates:   Allergen Reactions    Cockroach     Mold     Penicillins        SOC     Social History     Tobacco Use    Smoking status: Some Days     Current packs/day: 0.00     Types: Vaping with nicotine    Smokeless tobacco: Never   Substance Use Topics    Alcohol use: Not Currently    Drug use: Never         Family HX    Family History   Problem Relation Age of Onset    Rheum arthritis Maternal  "Grandmother     Osteoarthritis Maternal Grandmother             REVIEW OF SYSTEMS  General: Denies any fever or chills  Chest: Denies shortness of breath, wheezing, coughing, or sputum production  Heart: Denies chest pain, cold extremities, orthopenia, or reduced exercise tolerance  Musk: Positive for pain as noted to affected extremity in HPI   As noted above and per history of current illness above, otherwise negative in the remainder of the 14 systems.      PHYSICAL EXAM  Vitals:    08/15/23 1028   Weight: 66.2 kg (146 lb)   Height: 5' 2.01" (1.575 m)   PainSc:   4       General: This patient is well-developed, well-nourished and appears stated age, well-oriented to person, place and time, and cooperative and pleasant on today's visit    Lower Extremity Physical Exam    Vascular exam:   Dorsalis pedis and posterior tibial pulses palpable 2/4 bilaterally.   Capillary refill time immediate to the toes.   Feet are warm to the touch. Skin temperature warm to warm from proximally to distally   There are no varicosities, telangiectasias noted to bilateral foot and ankle regions.   There are no ecchymoses noted to bilateral foot and ankle regions.   There is mild edema right 5th MTPJ.     Dermatologic exam:   Skin moist with healthy texture and turgor.  There areno open ulcerations, lacerations, or fissures to bilateral foot and ankle regions.  There is no  evidence of ecchymosis or fracture blisters. There are no open wounds noted.    Neuro:   Epicritic sensation is intact as the patient is able to sense light touch to bilateral foot and ankle regions.   Achilles and patellar deep tendon reflexes intact  Babinski reflex absent    MSK:   + Wiggle toes   Pain with ROM of LEFT 5th MTPJ  (-) pain at 5th metatarsal base  (-) pain at fibular malleolus  (-) pain at medial malleolus  (-) pain upon palpation of tib-fib prox syndesmosis  (-) pain at ATFL, CFL, or PTFL  (-) pain at deltoid ligaments            ASSESSMENT  1. " Bursitis of left foot            PLAN  1. Patient was educated about clinical and imaging findings, and verbalizes understanding of above.     Diagnoses and all orders for this visit:  Bursitis of left foot    Other orders  -     triamcinolone acetonide injection 20 mg      Regarding the bursitis, I explained to the patient that the bursa usually cushions between surface but sometimes injury, increased friction and or pressure causes irritation and inflammation. We discussed off loading of pressure with padding and possible injection. Patient wished to proceed with injection at this time.     Injection was administered to the LEFT Fifth MTP joint and along bursa  with a mixture of 1 ml 1:1 mixture of 0.5% marcaine plain and 0.5cc kenalog 40 into LEFT fifth MTP joint. Pt tolerated well.     Adhesive pads and cushions were dispensed to the patient and the patient was also guided on anti inflammatories to be discontinued if any stomach irritation occurs.      Future Appointments   Date Time Provider Department Center   12/8/2023 12:00 PM Celio Wilder MD [facility] [place] [facility]

## 2023-12-15 ENCOUNTER — OFFICE VISIT (OUTPATIENT)
Dept: PODIATRY | Facility: CLINIC | Age: 33
End: 2023-12-15
Payer: COMMERCIAL

## 2023-12-15 ENCOUNTER — PATIENT MESSAGE (OUTPATIENT)
Dept: PODIATRY | Facility: CLINIC | Age: 33
End: 2023-12-15

## 2023-12-15 VITALS — BODY MASS INDEX: 26.87 KG/M2 | WEIGHT: 146 LBS | HEIGHT: 62 IN

## 2023-12-15 DIAGNOSIS — M79.672 LEFT FOOT PAIN: ICD-10-CM

## 2023-12-15 DIAGNOSIS — M77.8 EXTENSOR TENDONITIS OF FOOT: Primary | ICD-10-CM

## 2023-12-15 PROCEDURE — 99999 PR PBB SHADOW E&M-EST. PATIENT-LVL III: ICD-10-PCS | Mod: PBBFAC,,, | Performed by: PODIATRIST

## 2023-12-15 PROCEDURE — 99214 OFFICE O/P EST MOD 30 MIN: CPT | Mod: 25,S$GLB,, | Performed by: PODIATRIST

## 2023-12-15 PROCEDURE — 99214 PR OFFICE/OUTPT VISIT, EST, LEVL IV, 30-39 MIN: ICD-10-PCS | Mod: 25,S$GLB,, | Performed by: PODIATRIST

## 2023-12-15 PROCEDURE — 99999 PR PBB SHADOW E&M-EST. PATIENT-LVL III: CPT | Mod: PBBFAC,,, | Performed by: PODIATRIST

## 2023-12-15 PROCEDURE — 1159F MED LIST DOCD IN RCRD: CPT | Mod: CPTII,S$GLB,, | Performed by: PODIATRIST

## 2023-12-15 PROCEDURE — 3008F PR BODY MASS INDEX (BMI) DOCUMENTED: ICD-10-PCS | Mod: CPTII,S$GLB,, | Performed by: PODIATRIST

## 2023-12-15 PROCEDURE — 1159F PR MEDICATION LIST DOCUMENTED IN MEDICAL RECORD: ICD-10-PCS | Mod: CPTII,S$GLB,, | Performed by: PODIATRIST

## 2023-12-15 PROCEDURE — 20550 PR INJECT TENDON SHEATH/LIGAMENT: ICD-10-PCS | Mod: LT,S$GLB,, | Performed by: PODIATRIST

## 2023-12-15 PROCEDURE — 3008F BODY MASS INDEX DOCD: CPT | Mod: CPTII,S$GLB,, | Performed by: PODIATRIST

## 2023-12-15 PROCEDURE — 20550 NJX 1 TENDON SHEATH/LIGAMENT: CPT | Mod: LT,S$GLB,, | Performed by: PODIATRIST

## 2023-12-15 RX ORDER — TRIAMCINOLONE ACETONIDE 40 MG/ML
40 INJECTION, SUSPENSION INTRA-ARTICULAR; INTRAMUSCULAR ONCE
Status: COMPLETED | OUTPATIENT
Start: 2023-12-15 | End: 2023-12-15

## 2023-12-15 RX ORDER — TIZANIDINE 4 MG/1
2-4 TABLET ORAL EVERY 8 HOURS PRN
COMMUNITY
Start: 2023-10-15

## 2023-12-15 RX ADMIN — TRIAMCINOLONE ACETONIDE 40 MG: 40 INJECTION, SUSPENSION INTRA-ARTICULAR; INTRAMUSCULAR at 03:12

## 2023-12-15 NOTE — LETTER
December 15, 2023      [facility] Podiat[facility]  [address] [place] [address]  Phone: [phone]       Patient: Bessy Geller   YOB: 1990  Date of Visit: 12/15/2023    To Whom It May Concern:    Moraima Geller  was at [facility] on 12/15/2023. The patient was advised by the Podiatrist to wear tennis shoes for the next 4 weeks. If you have any questions or concerns, or if I can be of further assistance, please do not hesitate to contact me.    Sincerely,    Lisbet Alex MA

## 2023-12-15 NOTE — PROGRESS NOTES
[facility] -   PODIATRIC MEDICINE AND SURGERY  PROGRESS NOTE    Reason for Visit   Chief Complaint   Patient presents with    Foot Pain     C/o left foot pain by the pinky,  it has redness on the lateral side of the toe pt states it is painful when walking, She  previously had a shot in it for capsulitis, it may have worn off, pt rates pain today 6/10, pt  is non-diabetic       HPI  Bessy Geller is a 33 y.o. female  who presents today pain along left foot along dorsal lateral left foot. Denies any trauma. Pain is throbbing in nature and achy with ambulation.     Patient denies other pedal complaints at this time.      PMH  Past Medical History:   Diagnosis Date    Amenorrhea     Migraines     PCOS (polycystic ovarian syndrome)        MEDS  Current Outpatient Medications on File Prior to Visit   Medication Sig Dispense Refill    butalbital-acetaminophen-caff -40 mg Cap       ibuprofen (ADVIL,MOTRIN) 600 MG tablet Take 600 mg by mouth every 6 (six) hours as needed.      medroxyPROGESTERone (PROVERA) 10 MG tablet TAKE 1 TABLET(10 MG) BY MOUTH EVERY DAY FOR 14 DAYS 14 tablet 0    naproxen sodium (ANAPROX) 550 MG tablet Take 1 tablet (550 mg total) by mouth 3 (three) times daily with meals. 30 tablet 2    tiZANidine (ZANAFLEX) 4 MG tablet Take 2-4 mg by mouth every 8 (eight) hours as needed.       No current facility-administered medications on file prior to visit.       PSH     Past Surgical History:   Procedure Laterality Date    REMOVAL OF INTRAUTERINE DEVICE (IUD)  12/28/2022    iud - unable to be removed during ultrasound        ALL  Review of patient's allergies indicates:   Allergen Reactions    Cockroach     Mold     Penicillins        SOC     Social History     Tobacco Use    Smoking status: Some Days     Types: Vaping with nicotine    Smokeless tobacco: Never   Substance Use Topics    Alcohol use: Not Currently    Drug use: Never         Family HX    Family History   Problem  "Relation Age of Onset    Rheum arthritis Maternal Grandmother     Osteoarthritis Maternal Grandmother             REVIEW OF SYSTEMS  General: Denies any fever or chills  Chest: Denies shortness of breath, wheezing, coughing, or sputum production  Heart: Denies chest pain, cold extremities, orthopenia, or reduced exercise tolerance  Musk: Positive for pain as noted to affected extremity in HPI   As noted above and per history of current illness above, otherwise negative in the remainder of the 14 systems.      PHYSICAL EXAM  Vitals:    12/15/23 1147   Weight: 66.2 kg (146 lb)   Height: 5' 2" (1.575 m)   PainSc:   6       General: This patient is well-developed, well-nourished and appears stated age, well-oriented to person, place and time, and cooperative and pleasant on today's visit    Lower Extremity Physical Exam    Vascular exam:   Dorsalis pedis and posterior tibial pulses palpable 2/4 bilaterally.   Capillary refill time immediate to the toes.   Feet are warm to the touch. Skin temperature warm to warm from proximally to distally   There are no varicosities, telangiectasias noted to bilateral foot and ankle regions.   There are no ecchymoses noted to bilateral foot and ankle regions.   There is mild edema right 5th MTPJ.     Dermatologic exam:   Skin moist with healthy texture and turgor.  There areno open ulcerations, lacerations, or fissures to bilateral foot and ankle regions.  There is no  evidence of ecchymosis or fracture blisters. There are no open wounds noted.    Neuro:   Epicritic sensation is intact as the patient is able to sense light touch to bilateral foot and ankle regions.   Achilles and patellar deep tendon reflexes intact  Babinski reflex absent    MSK:   + Wiggle toes   Pain along Extensor tendon dorsal lateral left foot  (-) pain at 5th metatarsal base  (-) pain at fibular malleolus  (-) pain at medial malleolus  (-) pain upon palpation of tib-fib prox syndesmosis  (-) pain at ATFL, CFL, " or PTFL  (-) pain at deltoid ligaments            ASSESSMENT  1. Extensor tendonitis of foot        2. Left foot pain              PLAN  1. Patient was educated about clinical and imaging findings, and verbalizes understanding of above.     Diagnoses and all orders for this visit:  Extensor tendonitis of foot    Left foot pain      For pain and swelling associated with acute symptoms of tenosynovitis,   injection was offered to the patient.  Also the importance of removing the pressure load along that joint was emphasized to the patient to prevent further injury and degeneration of the joint. The patient was given recommendations for orthotic inserts to purchase and bring back on follow up to properly modify and reinforce to appropriately offload the inflamed joints.     Injection was administered to the extensor tendon sheath with total 1 cc of -0.5 ml of  0.5% marcaine plan and 0.5 cc of kenalog 40.        Future Appointments   Date Time Provider Department Center   2/2/2024 12:00 PM Celio Wilder MD [facility] [place] [facility]

## 2023-12-18 ENCOUNTER — PATIENT MESSAGE (OUTPATIENT)
Dept: PODIATRY | Facility: CLINIC | Age: 33
End: 2023-12-18
Payer: COMMERCIAL

## 2024-01-12 ENCOUNTER — OFFICE VISIT (OUTPATIENT)
Dept: PODIATRY | Facility: CLINIC | Age: 34
End: 2024-01-12
Payer: COMMERCIAL

## 2024-01-12 ENCOUNTER — HOSPITAL ENCOUNTER (OUTPATIENT)
Dept: RADIOLOGY | Facility: HOSPITAL | Age: 34
Discharge: HOME | End: 2024-01-12
Attending: PODIATRIST
Payer: COMMERCIAL

## 2024-01-12 VITALS — BODY MASS INDEX: 26.87 KG/M2 | WEIGHT: 146 LBS | HEIGHT: 62 IN

## 2024-01-12 DIAGNOSIS — M79.672 LEFT FOOT PAIN: ICD-10-CM

## 2024-01-12 DIAGNOSIS — G57.62 MORTON'S NEUROMA OF THIRD INTERSPACE OF LEFT FOOT: ICD-10-CM

## 2024-01-12 DIAGNOSIS — M79.672 LEFT FOOT PAIN: Primary | ICD-10-CM

## 2024-01-12 PROCEDURE — 64455 NJX AA&/STRD PLTR COM DG NRV: CPT | Mod: LT,S$GLB,, | Performed by: PODIATRIST

## 2024-01-12 PROCEDURE — 99214 OFFICE O/P EST MOD 30 MIN: CPT | Mod: 25,S$GLB,, | Performed by: PODIATRIST

## 2024-01-12 PROCEDURE — 99999 PR PBB SHADOW E&M-EST. PATIENT-LVL III: CPT | Mod: PBBFAC,,, | Performed by: PODIATRIST

## 2024-01-12 PROCEDURE — 73630 X-RAY EXAM OF FOOT: CPT | Mod: TC,LT

## 2024-01-12 PROCEDURE — 73630 X-RAY EXAM OF FOOT: CPT | Mod: 26,LT,, | Performed by: RADIOLOGY

## 2024-01-12 PROCEDURE — 1160F RVW MEDS BY RX/DR IN RCRD: CPT | Mod: CPTII,S$GLB,, | Performed by: PODIATRIST

## 2024-01-12 PROCEDURE — 1159F MED LIST DOCD IN RCRD: CPT | Mod: CPTII,S$GLB,, | Performed by: PODIATRIST

## 2024-01-12 RX ORDER — TRIAMCINOLONE ACETONIDE 40 MG/ML
40 INJECTION, SUSPENSION INTRA-ARTICULAR; INTRAMUSCULAR
Status: DISCONTINUED | OUTPATIENT
Start: 2024-01-12 | End: 2024-01-12 | Stop reason: HOSPADM

## 2024-01-12 RX ORDER — DEXAMETHASONE SODIUM PHOSPHATE 4 MG/ML
4 INJECTION, SOLUTION INTRA-ARTICULAR; INTRALESIONAL; INTRAMUSCULAR; INTRAVENOUS; SOFT TISSUE
Status: DISCONTINUED | OUTPATIENT
Start: 2024-01-12 | End: 2024-01-12 | Stop reason: HOSPADM

## 2024-01-12 RX ADMIN — DEXAMETHASONE SODIUM PHOSPHATE 4 MG: 4 INJECTION, SOLUTION INTRA-ARTICULAR; INTRALESIONAL; INTRAMUSCULAR; INTRAVENOUS; SOFT TISSUE at 08:01

## 2024-01-12 RX ADMIN — TRIAMCINOLONE ACETONIDE 40 MG: 40 INJECTION, SUSPENSION INTRA-ARTICULAR; INTRAMUSCULAR at 08:01

## 2024-01-12 NOTE — PROCEDURES
Neuroma injection    Date/Time: 1/12/2024 8:45 AM    Performed by: Padmini Grajeda DPM  Authorized by: Padmini Grajeda DPM    Consent Done?:  Yes (Verbal)  Indications:  Diagnostic evaluation and pain  Site marked: the procedure site was marked    Timeout: prior to procedure the correct patient, procedure, and site was verified    Prep: patient was prepped and draped in usual sterile fashion      Local anesthesia used?: Yes   Location Left Foot:  Third Webspace  Needle size:  25 G  Approach:  Dorsal  Medications:  4 mg dexAMETHasone 4 mg/mL; 40 mg triamcinolone acetonide 40 mg/mL  Patient tolerance:  Patient tolerated the procedure well with no immediate complications

## 2024-01-12 NOTE — PROGRESS NOTES
"  Subjective:       Patient ID: Bessy Geller is a 33 y.o. female.    Chief Complaint: Ankle Pain (C/o left foot Ankle hurts the most, but top, side, & ball of foot also hurt, pt states this been going on since  since august, pt rates pain 6/10 , pt is a non-diabetic )    HPI: Bessy Geller presents to the clinic today, concerning discomfort at the   Left foot.  States having radiating pains at times.  Majority of pain appear to be plantarly.  She reports abnormal sensation to the area.  Has been following with Dr. Aurora Toledo  who diagnosed her with extensor tendinitis and capsulitis.  She states shoes provided to steroid injections but did not have improvement in her symptoms.  States continued to have 6/10 pain and request a 2nd opinion for evaluation.  Previous x-rays were negative for fracturing. Yaya Malcolm Jr., MD is patient's primary care provider.     Review of patient's allergies indicates:   Allergen Reactions    Cockroach     Mold     Penicillins        Past Medical History:   Diagnosis Date    Amenorrhea     Migraines     PCOS (polycystic ovarian syndrome)        Family History   Problem Relation Age of Onset    Rheum arthritis Maternal Grandmother     Osteoarthritis Maternal Grandmother        Social History     Socioeconomic History    Marital status:    Tobacco Use    Smoking status: Former     Types: Vaping with nicotine    Smokeless tobacco: Never   Substance and Sexual Activity    Alcohol use: Not Currently    Drug use: Never    Sexual activity: Yes     Partners: Male     Birth control/protection: I.U.D.     Comment: would like IUD removal, discuss pre-conception       Past Surgical History:   Procedure Laterality Date    REMOVAL OF INTRAUTERINE DEVICE (IUD)  12/28/2022    iud - unable to be removed during ultrasound       Review of Systems       Objective:   Ht 5' 2" (1.575 m)   Wt 66.2 kg (146 lb)   BMI 26.70 kg/m²     X-Ray Foot Complete Left  Narrative: " EXAMINATION:  XR FOOT COMPLETE 3 VIEW LEFT    CLINICAL HISTORY:  .  Pain in left foot    TECHNIQUE:  AP, lateral and oblique views of the left foot were performed.    COMPARISON:  08/15/2023    FINDINGS:  There is no radiographic evidence of acute osseous, articular, or soft tissue abnormality.  Joint spaces are well preserved.  No erosive changes demonstrated.  Impression: No acute findings    Electronically signed by: Froy Bright MD  Date:    01/12/2024  Time:    09:45       Physical Exam    LOWER EXTREMITY PHYSICAL EXAMINATION    DERMATOLOGY: Skin is supple, dry and intact. No ecchymosis is noted. No hypertrophic skin formation. No erythema or cellulitis is noted.    VASCULAR: Pulses are palpable to the B/L lower extremity. The right dorsalis pedis pulse is 2/4 and the posterior tibial pulse is 2/4. The left dorsalis pedis pulse is 2/4 and the posterior tibial pulse is 2/4. Hair growth is noted on the dorsal foot and digits. Proximal to distal, warm to warm. Capillary refill time is WNL at less than 3s.    ORTHOPEDIC: Manual Muscle Testing is 5/5 in all planes on the   Pain on palpation to the dorsal aspect of the left foot.  Pain with medial to lateral squeeze of the 3rd intermetatarsal space with reproducible obvious Anisha's click., without pains, with and without resistance. No pains to palpation of the medial or lateral ankle ligaments. No discomfort to palpation of the posterior tibial tendon, peroneal tendon, Achilles tendon or the anterior ankle tendons.    NEUROLOGY: Protective sensation is intact via 5.07 Miller Estuardo monofilament. Proprioception is intact. Sensation to light touch is intact. Vibratory sensation is WNL.  Positive Anisha's click.    Assessment:     1. Left foot pain    2. 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  We discussed having inflammation to the common plantar digital nerves likely being associated with repetitive trauma and/or repetitive microtrauma associated with excessive motion at the metatarsal heads causing inflammation to the nerve.  We discussed treatment options regarding metatarsal padding, offloading, anti-inflammatories, and/or intermetatarsal space cortical steroid injection to provide pain relief.     Patient relates interest in steroid injection at this time.  Did discuss risk of steroid use as relates to increased anxiety, insomnia, post injection steroid flares, elevated blood sugars, swelling, pigment/skin changes.  Patient relates understanding.  Recommend use of anti-inflammatories with icing to decrease risk of a post-injection steroid flare.     Recommend patient to utilize metatarsal pad in her shoe gear versus inserts as it will help offload the metatarsal heads from causing recurrent inflammatory reaction to the corresponding nerve leading to repetitive inflammation neuroma formation.    Future Appointments   Date Time Provider Department Center   2/16/2024  9:20 AM Celio Wilder MD [facility] [place] [facility]

## 2024-03-08 ENCOUNTER — LAB VISIT (OUTPATIENT)
Dept: LAB | Facility: HOSPITAL | Age: 34
End: 2024-03-08
Attending: FAMILY MEDICINE
Payer: COMMERCIAL

## 2024-03-08 ENCOUNTER — OFFICE VISIT (OUTPATIENT)
Dept: PRIMARY CARE CLINIC | Facility: CLINIC | Age: 34
End: 2024-03-08
Payer: COMMERCIAL

## 2024-03-08 VITALS
HEIGHT: 63 IN | DIASTOLIC BLOOD PRESSURE: 74 MMHG | HEART RATE: 88 BPM | WEIGHT: 136.44 LBS | OXYGEN SATURATION: 97 % | SYSTOLIC BLOOD PRESSURE: 118 MMHG | TEMPERATURE: 99 F | BODY MASS INDEX: 24.18 KG/M2

## 2024-03-08 DIAGNOSIS — E28.2 PCOS (POLYCYSTIC OVARIAN SYNDROME): ICD-10-CM

## 2024-03-08 DIAGNOSIS — N91.1 AMENORRHEA, SECONDARY: ICD-10-CM

## 2024-03-08 DIAGNOSIS — R53.83 FATIGUE, UNSPECIFIED TYPE: ICD-10-CM

## 2024-03-08 DIAGNOSIS — N92.1 MENORRHAGIA WITH IRREGULAR CYCLE: ICD-10-CM

## 2024-03-08 DIAGNOSIS — E55.9 VITAMIN D DEFICIENCY: ICD-10-CM

## 2024-03-08 LAB
ALBUMIN SERPL BCP-MCNC: 4.7 G/DL (ref 3.5–5.2)
ALP SERPL-CCNC: 98 U/L (ref 55–135)
ALT SERPL W/O P-5'-P-CCNC: 10 U/L (ref 10–44)
ANION GAP SERPL CALC-SCNC: 14 MMOL/L (ref 8–16)
AST SERPL-CCNC: 18 U/L (ref 10–40)
BASOPHILS # BLD AUTO: 0.04 K/UL (ref 0–0.2)
BASOPHILS NFR BLD: 0.5 % (ref 0–1.9)
BILIRUB SERPL-MCNC: 0.5 MG/DL (ref 0.1–1)
BUN SERPL-MCNC: 10 MG/DL (ref 6–20)
CALCIUM SERPL-MCNC: 10.3 MG/DL (ref 8.7–10.5)
CHLORIDE SERPL-SCNC: 103 MMOL/L (ref 95–110)
CHOLEST SERPL-MCNC: 173 MG/DL (ref 120–199)
CHOLEST/HDLC SERPL: 3.1 {RATIO} (ref 2–5)
CO2 SERPL-SCNC: 25 MMOL/L (ref 23–29)
CREAT SERPL-MCNC: 0.9 MG/DL (ref 0.5–1.4)
DIFFERENTIAL METHOD BLD: ABNORMAL
EOSINOPHIL # BLD AUTO: 0 K/UL (ref 0–0.5)
EOSINOPHIL NFR BLD: 0.4 % (ref 0–8)
ERYTHROCYTE [DISTWIDTH] IN BLOOD BY AUTOMATED COUNT: 13.2 % (ref 11.5–14.5)
EST. GFR  (NO RACE VARIABLE): >60 ML/MIN/1.73 M^2
ESTIMATED AVG GLUCOSE: 97 MG/DL (ref 68–131)
GLUCOSE SERPL-MCNC: 89 MG/DL (ref 70–110)
HBA1C MFR BLD: 5 % (ref 4–5.6)
HCT VFR BLD AUTO: 45.2 % (ref 37–48.5)
HDLC SERPL-MCNC: 56 MG/DL (ref 40–75)
HDLC SERPL: 32.4 % (ref 20–50)
HGB BLD-MCNC: 14.4 G/DL (ref 12–16)
IMM GRANULOCYTES # BLD AUTO: 0.03 K/UL (ref 0–0.04)
IMM GRANULOCYTES NFR BLD AUTO: 0.4 % (ref 0–0.5)
INSULIN COLLECTION INTERVAL: NORMAL
INSULIN SERPL-ACNC: 4.3 UU/ML
IRON SERPL-MCNC: 50 UG/DL (ref 30–160)
LDLC SERPL CALC-MCNC: 104.4 MG/DL (ref 63–159)
LYMPHOCYTES # BLD AUTO: 1.7 K/UL (ref 1–4.8)
LYMPHOCYTES NFR BLD: 19.9 % (ref 18–48)
MCH RBC QN AUTO: 29 PG (ref 27–31)
MCHC RBC AUTO-ENTMCNC: 31.9 G/DL (ref 32–36)
MCV RBC AUTO: 91 FL (ref 82–98)
MONOCYTES # BLD AUTO: 0.5 K/UL (ref 0.3–1)
MONOCYTES NFR BLD: 5.8 % (ref 4–15)
NEUTROPHILS # BLD AUTO: 6.2 K/UL (ref 1.8–7.7)
NEUTROPHILS NFR BLD: 73 % (ref 38–73)
NONHDLC SERPL-MCNC: 117 MG/DL
NRBC BLD-RTO: 0 /100 WBC
PLATELET # BLD AUTO: 335 K/UL (ref 150–450)
PMV BLD AUTO: 11 FL (ref 9.2–12.9)
POTASSIUM SERPL-SCNC: 4.7 MMOL/L (ref 3.5–5.1)
PROT SERPL-MCNC: 8.1 G/DL (ref 6–8.4)
RBC # BLD AUTO: 4.97 M/UL (ref 4–5.4)
SATURATED IRON: 11 % (ref 20–50)
SODIUM SERPL-SCNC: 142 MMOL/L (ref 136–145)
TOTAL IRON BINDING CAPACITY: 448 UG/DL (ref 250–450)
TRANSFERRIN SERPL-MCNC: 303 MG/DL (ref 200–375)
TRIGL SERPL-MCNC: 63 MG/DL (ref 30–150)
TSH SERPL DL<=0.005 MIU/L-ACNC: 1 UIU/ML (ref 0.4–4)
WBC # BLD AUTO: 8.41 K/UL (ref 3.9–12.7)

## 2024-03-08 PROCEDURE — 83540 ASSAY OF IRON: CPT | Performed by: FAMILY MEDICINE

## 2024-03-08 PROCEDURE — 83036 HEMOGLOBIN GLYCOSYLATED A1C: CPT | Performed by: FAMILY MEDICINE

## 2024-03-08 PROCEDURE — 3074F SYST BP LT 130 MM HG: CPT | Mod: CPTII,S$GLB,, | Performed by: FAMILY MEDICINE

## 2024-03-08 PROCEDURE — 83525 ASSAY OF INSULIN: CPT | Performed by: FAMILY MEDICINE

## 2024-03-08 PROCEDURE — 3008F BODY MASS INDEX DOCD: CPT | Mod: CPTII,S$GLB,, | Performed by: FAMILY MEDICINE

## 2024-03-08 PROCEDURE — 1159F MED LIST DOCD IN RCRD: CPT | Mod: CPTII,S$GLB,, | Performed by: FAMILY MEDICINE

## 2024-03-08 PROCEDURE — 84443 ASSAY THYROID STIM HORMONE: CPT | Performed by: FAMILY MEDICINE

## 2024-03-08 PROCEDURE — 3078F DIAST BP <80 MM HG: CPT | Mod: CPTII,S$GLB,, | Performed by: FAMILY MEDICINE

## 2024-03-08 PROCEDURE — 1160F RVW MEDS BY RX/DR IN RCRD: CPT | Mod: CPTII,S$GLB,, | Performed by: FAMILY MEDICINE

## 2024-03-08 PROCEDURE — 99214 OFFICE O/P EST MOD 30 MIN: CPT | Mod: S$GLB,,, | Performed by: FAMILY MEDICINE

## 2024-03-08 PROCEDURE — 80061 LIPID PANEL: CPT | Performed by: FAMILY MEDICINE

## 2024-03-08 PROCEDURE — 80053 COMPREHEN METABOLIC PANEL: CPT | Performed by: FAMILY MEDICINE

## 2024-03-08 PROCEDURE — 82652 VIT D 1 25-DIHYDROXY: CPT | Performed by: FAMILY MEDICINE

## 2024-03-08 PROCEDURE — 85025 COMPLETE CBC W/AUTO DIFF WBC: CPT | Performed by: FAMILY MEDICINE

## 2024-03-08 PROCEDURE — 99999 PR PBB SHADOW E&M-EST. PATIENT-LVL IV: CPT | Mod: PBBFAC,,, | Performed by: FAMILY MEDICINE

## 2024-03-08 NOTE — PROGRESS NOTES
Subjective     Referring MD: Meño  PCP: May  BMI noted 24  Diet: variable   Exercise/Activity: goes to gym few times/week  Sleep: fair  Stressors:  GM passed away recently   Anxiety/Depression Screen/PHQ-2:   Works at [employer]/tends to be more than 40 hrs/week    Labs reviewed:     Patient ID: Bessy Geller is a 33 y.o. female.    Chief Complaint: PCOS    Pt has lost 10 lb since Jan; weight is in within normal limits range.     No hx of autoimmune. Hx of low D.    Pt noticed when got IUD out in Dec 2022 had some abd cramping/lower abd pain. Had not had cycle in a while. Pt thought had endometriosis; was told likely not. Took progesterone x 1.     Pt does not want to get back on ocp. Periods are irregular, bleeding a good bit.  Fatigue +. Quality of life has changed.    Hx of migraines and tension HA; lost too much weight.     Was on metformin; had gi upset.    Food recall:  Bfast: banana, bread  with plant based cream cheese  Lunch:eats out once weekly; mila bread; sandwich   Dinner: frozen pizza 1x/week, baked potato, breakfast  Snack: string cheese, grapes, strawberries, summer,  hummus  Peanut butter crackers   Water: adequate  Sugar Sweetened beverages: SF energy drink 1-2 times/week  Etoh: very rare    HPI       Objective     PAST MEDICAL HISTORY:  Past Medical History:   Diagnosis Date    Amenorrhea     Migraines     PCOS (polycystic ovarian syndrome)          PAST SURGICAL HISTORY:  Past Surgical History:   Procedure Laterality Date    REMOVAL OF INTRAUTERINE DEVICE (IUD)  12/28/2022    iud - unable to be removed during ultrasound       FAMILY HISTORY:  Family History   Problem Relation Age of Onset    Rheum arthritis Maternal Grandmother     Osteoarthritis Maternal Grandmother           SOCIAL HISTORY:  Social History     Social History Narrative    Not on file       MEDICATIONS:  Medications have been reviewed.    ALLERGIES:  Allergies have been reviewed.    Vitals:    03/08/24 1003   BP:  118/74   Pulse: 88   Temp: 98.7 °F (37.1 °C)     Wt Readings from Last 10 Encounters:   03/08/24 61.9 kg (136 lb 7.4 oz)   01/12/24 66.2 kg (146 lb)   12/15/23 66.2 kg (146 lb)   08/15/23 66.2 kg (146 lb)   08/10/23 66.2 kg (146 lb)   05/10/23 66.2 kg (146 lb)   12/02/22 68.5 kg (151 lb)   09/19/22 68 kg (150 lb)   08/24/22 67.1 kg (148 lb)   11/19/21 57.2 kg (126 lb 3.2 oz)       Lab Results   Component Value Date    TSH 1.351 05/17/2023       Review of Systems   Constitutional:  Positive for fatigue. Negative for activity change, appetite change and fever.   HENT:  Negative for mouth dryness and goiter.    Eyes:  Negative for visual disturbance.   Respiratory:  Negative for apnea, cough, chest tightness and shortness of breath.    Cardiovascular:  Negative for chest pain, palpitations and leg swelling.   Gastrointestinal:  Negative for abdominal pain, constipation, diarrhea, nausea, vomiting and reflux.   Endocrine: Negative for cold intolerance, heat intolerance, polydipsia, polyphagia and polyuria.   Genitourinary:  Negative for frequency and menstrual problem.   Musculoskeletal:  Negative for arthralgias and myalgias.   Integumentary:  Negative for color change and rash.   Neurological:  Negative for dizziness, vertigo, tremors, seizures, syncope, weakness, numbness, headaches and memory loss.   Psychiatric/Behavioral:  Negative for confusion, decreased concentration, hallucinations, self-injury, sleep disturbance and suicidal ideas. The patient is nervous/anxious. The patient is not hyperactive.        Physical Exam  Vitals and nursing note reviewed.   Constitutional:       General: She is not in acute distress.  HENT:      Head: Normocephalic and atraumatic.      Mouth/Throat:      Pharynx: Oropharynx is clear.   Eyes:      General: No scleral icterus.     Pupils: Pupils are equal, round, and reactive to light.   Neck:      Comments: No TM  Cardiovascular:      Rate and Rhythm: Normal rate and regular rhythm.       Pulses: Normal pulses.      Heart sounds: Normal heart sounds. No murmur heard.     No friction rub. No gallop.   Pulmonary:      Effort: Pulmonary effort is normal. No respiratory distress.      Breath sounds: Normal breath sounds. No wheezing, rhonchi or rales.   Abdominal:      General: Bowel sounds are normal. There is no distension.      Palpations: Abdomen is soft.      Tenderness: There is no abdominal tenderness.   Musculoskeletal:         General: No swelling.      Cervical back: Normal range of motion and neck supple. No tenderness.   Lymphadenopathy:      Cervical: No cervical adenopathy.   Skin:     General: Skin is warm.      Findings: No erythema or rash.   Neurological:      Mental Status: She is alert and oriented to person, place, and time.   Psychiatric:         Mood and Affect: Mood normal.         Behavior: Behavior normal.              Assessment and Plan     1. PCOS (polycystic ovarian syndrome)  -     Ambulatory referral/consult to PCOS Clinic  -     Ambulatory Referral/Consult to Lifestyle Nutrition; Future; Expected date: 03/15/2024  -     Lipid Panel; Future; Expected date: 03/08/2024  -     TSH; Future; Expected date: 03/08/2024  -     Insulin, Random; Future; Expected date: 03/08/2024  -     Hemoglobin A1C; Future; Expected date: 03/08/2024  -     Comprehensive Metabolic Panel; Future; Expected date: 03/08/2024    2. Amenorrhea, secondary  -     Ambulatory referral/consult to PCOS Clinic  -     CBC Auto Differential; Future; Expected date: 03/08/2024  -     IRON AND TIBC; Future; Expected date: 03/08/2024    3. Fatigue, unspecified type  -     Ambulatory referral/consult to PCOS Clinic  -     Ambulatory Referral/Consult to Lifestyle Nutrition; Future; Expected date: 03/15/2024  -     TSH; Future; Expected date: 03/08/2024  -     CBC Auto Differential; Future; Expected date: 03/08/2024  -     IRON AND TIBC; Future; Expected date: 03/08/2024    4. BMI 24.0-24.9, adult  -     Ambulatory  Referral/Consult to Lifestyle Nutrition; Future; Expected date: 03/15/2024    5. Menorrhagia with irregular cycle  -     TSH; Future; Expected date: 03/08/2024  -     CBC Auto Differential; Future; Expected date: 03/08/2024    6. Vitamin D deficiency  -     Calcitriol; Future; Expected date: 03/08/2024      1. PCOS (polycystic ovarian syndrome)    2. Amenorrhea, secondary    3. Fatigue, unspecified type    4. BMI 24.0-24.9, adult    5. Menorrhagia with irregular cycle    6. Vitamin D deficiency          PCOS (polycystic ovarian syndrome)  -     Ambulatory referral/consult to PCOS Clinic  -     Ambulatory Referral/Consult to Lifestyle Nutrition; Future; Expected date: 03/15/2024  -     Lipid Panel; Future; Expected date: 03/08/2024  -     TSH; Future; Expected date: 03/08/2024  -     Insulin, Random; Future; Expected date: 03/08/2024  -     Hemoglobin A1C; Future; Expected date: 03/08/2024  -     Comprehensive Metabolic Panel; Future; Expected date: 03/08/2024    Amenorrhea, secondary  -     Ambulatory referral/consult to PCOS Clinic  -     CBC Auto Differential; Future; Expected date: 03/08/2024  -     IRON AND TIBC; Future; Expected date: 03/08/2024    Fatigue, unspecified type  -     Ambulatory referral/consult to PCOS Clinic  -     Ambulatory Referral/Consult to Lifestyle Nutrition; Future; Expected date: 03/15/2024  -     TSH; Future; Expected date: 03/08/2024  -     CBC Auto Differential; Future; Expected date: 03/08/2024  -     IRON AND TIBC; Future; Expected date: 03/08/2024    BMI 24.0-24.9, adult  -     Ambulatory Referral/Consult to Lifestyle Nutrition; Future; Expected date: 03/15/2024    Menorrhagia with irregular cycle  -     TSH; Future; Expected date: 03/08/2024  -     CBC Auto Differential; Future; Expected date: 03/08/2024    Vitamin D deficiency  -     Calcitriol; Future; Expected date: 03/08/2024       New pt folder reviewed--dw her lifestyle interventions  Checking labs today     Follow up in  about 8 weeks (around 5/3/2024), or if symptoms worsen or fail to improve.

## 2024-03-11 ENCOUNTER — PATIENT MESSAGE (OUTPATIENT)
Dept: PRIMARY CARE CLINIC | Facility: CLINIC | Age: 34
End: 2024-03-11
Payer: COMMERCIAL

## 2024-03-11 LAB — 1,25(OH)2D3 SERPL-MCNC: 43 PG/ML (ref 20–79)

## 2024-03-25 ENCOUNTER — ON-DEMAND VIRTUAL (OUTPATIENT)
Dept: URGENT CARE | Facility: CLINIC | Age: 34
End: 2024-03-25
Payer: COMMERCIAL

## 2024-03-25 DIAGNOSIS — F41.9 ANXIETY: Primary | ICD-10-CM

## 2024-03-25 PROCEDURE — 99212 OFFICE O/P EST SF 10 MIN: CPT | Mod: 95,,, | Performed by: NURSE PRACTITIONER

## 2024-03-25 NOTE — PROGRESS NOTES
Subjective:      Patient ID: Bessy Geller is a 33 y.o. female.    Vitals:  vitals were not taken for this visit.     Chief Complaint: Anxiety      Visit Type: TELE AUDIOVISUAL    Present with the patient at the time of consultation: TELEMED PRESENT WITH PATIENT: None        Past Medical History:   Diagnosis Date    Amenorrhea     Migraines     PCOS (polycystic ovarian syndrome)      Past Surgical History:   Procedure Laterality Date    REMOVAL OF INTRAUTERINE DEVICE (IUD)  12/28/2022    iud - unable to be removed during ultrasound     Review of patient's allergies indicates:   Allergen Reactions    Cockroach     Mold     Penicillins      Current Outpatient Medications on File Prior to Visit   Medication Sig Dispense Refill    butalbital-acetaminophen-caff -40 mg Cap       ibuprofen (ADVIL,MOTRIN) 600 MG tablet Take 600 mg by mouth every 6 (six) hours as needed.      medroxyPROGESTERone (PROVERA) 10 MG tablet TAKE 1 TABLET(10 MG) BY MOUTH EVERY DAY FOR 14 DAYS 14 tablet 0    naproxen sodium (ANAPROX) 550 MG tablet Take 1 tablet (550 mg total) by mouth 3 (three) times daily with meals. 30 tablet 2    tiZANidine (ZANAFLEX) 4 MG tablet Take 2-4 mg by mouth every 8 (eight) hours as needed.       No current facility-administered medications on file prior to visit.     Family History   Problem Relation Age of Onset    Rheum arthritis Maternal Grandmother     Osteoarthritis Maternal Grandmother            Ohs Peq Odvv Intake    3/25/2024  3:31 PM CDT - Filed by Patient   What is your current physical address in the event of a medical emergency? [id number] [place] [address]   Are you able to take your vital signs? No   Please attach any relevant images or files          32 yo female with c/o anxiety and stress due traveling soon and grandmother's death. She denies si/hi. She has a hx of panic attacks.     Anxiety            Constitution: Negative.   HENT: Negative.     Cardiovascular: Negative.     Respiratory: Negative.     Gastrointestinal: Negative.    Endocrine: negative.   Genitourinary: Negative.  Negative for frequency and urgency.   Musculoskeletal: Negative.    Skin: Negative.    Allergic/Immunologic: Negative.    Neurological: Negative.    Hematologic/Lymphatic: Negative.    Psychiatric/Behavioral: Negative.          Objective:   The physical exam was conducted virtually.  LOCATION OF PATIENT home  Physical Exam   Constitutional: She is oriented to person, place, and time. She appears well-developed.   HENT:   Head: Normocephalic and atraumatic.   Ears:   Right Ear: Hearing, tympanic membrane and external ear normal.   Left Ear: Hearing, tympanic membrane and external ear normal.   Nose: Nose normal.   Mouth/Throat: Uvula is midline, oropharynx is clear and moist and mucous membranes are normal.   Eyes: Conjunctivae and EOM are normal. Pupils are equal, round, and reactive to light.   Neck: Neck supple.   Cardiovascular: Normal rate.   Pulmonary/Chest: Effort normal and breath sounds normal.   Musculoskeletal: Normal range of motion.         General: Normal range of motion.   Neurological: She is alert and oriented to person, place, and time.   Skin: Skin is warm.   Psychiatric: Her behavior is normal. Mood, judgment and thought content normal.   Nursing note and vitals reviewed.      Assessment:     1. Anxiety        Plan:   Hydroxyzine offered to patient but declined. Advised can not prescribe controlled substances on this site.     Thank you for choosing [facility]!    Our goal in the [facility] is to always provide outstanding medical care. You may receive a survey by mail or e-mail in the next week regarding your experience today. We would greatly appreciate you completing and returning the survey. Your feedback provides us with a way to recognize our staff who provide very good care, and it helps us learn how to improve when your experience was below our  aspiration of excellence.         We appreciate you trusting us with your medical care. We hope you feel better soon. We will be happy to take care of you for all of your future medical needs.    You must understand that you've received an Urgent Care treatment only and that you may be released before all your medical problems are known or treated. You, the patient, will arrange for follow up care as instructed.    Follow up with your PCP or specialty clinic as directed in the next 1-2 weeks if not improved or as needed.  You can call [phone] to schedule an appointment with the appropriate provider.    If your condition worsens we recommend that you receive another evaluation in person, with your primary care provider, urgent care or at the emergency room immediately or contact your primary medical clinics after hours call service to discuss your concerns.           Anxiety

## 2024-04-19 ENCOUNTER — PATIENT MESSAGE (OUTPATIENT)
Dept: RESEARCH | Facility: HOSPITAL | Age: 34
End: 2024-04-19
Payer: COMMERCIAL

## 2024-04-29 ENCOUNTER — OFFICE VISIT (OUTPATIENT)
Dept: PRIMARY CARE CLINIC | Facility: CLINIC | Age: 34
End: 2024-04-29
Payer: COMMERCIAL

## 2024-04-29 DIAGNOSIS — E61.1 LOW IRON: ICD-10-CM

## 2024-04-29 DIAGNOSIS — E28.2 PCOS (POLYCYSTIC OVARIAN SYNDROME): Primary | ICD-10-CM

## 2024-04-29 PROCEDURE — 99213 OFFICE O/P EST LOW 20 MIN: CPT | Mod: 95,,, | Performed by: FAMILY MEDICINE

## 2024-04-29 PROCEDURE — 1159F MED LIST DOCD IN RCRD: CPT | Mod: CPTII,95,, | Performed by: FAMILY MEDICINE

## 2024-04-29 PROCEDURE — 1160F RVW MEDS BY RX/DR IN RCRD: CPT | Mod: CPTII,95,, | Performed by: FAMILY MEDICINE

## 2024-04-29 PROCEDURE — 3044F HG A1C LEVEL LT 7.0%: CPT | Mod: CPTII,95,, | Performed by: FAMILY MEDICINE

## 2024-04-29 NOTE — PATIENT INSTRUCTIONS
Please make a follow up with primary care    Please call [facility] for an appointment (metabolic clinic)

## 2024-04-29 NOTE — PROGRESS NOTES
Subjective     The patient location is: [place]  The chief complaint leading to consultation is: followup    Visit type: audiovisual    Face to Face time with patient: 12  13 minutes of total time spent on the encounter, which includes face to face time and non-face to face time preparing to see the patient (eg, review of tests), Obtaining and/or reviewing separately obtained history, Documenting clinical information in the electronic or other health record, Independently interpreting results (not separately reported) and communicating results to the patient/family/caregiver, or Care coordination (not separately reported).         Each patient to whom he or she provides medical services by telemedicine is:  (1) informed of the relationship between the physician and patient and the respective role of any other health care provider with respect to management of the patient; and (2) notified that he or she may decline to receive medical services by telemedicine and may withdraw from such care at any time.    Notes:     Patient ID: Bessy Geller is a 33 y.o. female.    Chief Complaint: follow up    Hx of PCOS, BMI 24.   Pt is aware does not qualify for any anti obesity medications.     Partially compliant   GI intolerance of metformin.     Working on lifestyle interventions    HPI       Objective     PAST MEDICAL HISTORY:  Past Medical History:   Diagnosis Date    Amenorrhea     Migraines     PCOS (polycystic ovarian syndrome)          PAST SURGICAL HISTORY:  Past Surgical History:   Procedure Laterality Date    REMOVAL OF INTRAUTERINE DEVICE (IUD)  12/28/2022    iud - unable to be removed during ultrasound       FAMILY HISTORY:  Family History   Problem Relation Name Age of Onset    Rheum arthritis Maternal Grandmother Megan Leonard     Osteoarthritis Maternal Grandmother Megan Leonard           SOCIAL HISTORY:  Social History     Social History Narrative    Not on file       MEDICATIONS:  Medications have been  reviewed.    ALLERGIES:  Allergies have been reviewed.    There were no vitals filed for this visit.  Wt Readings from Last 10 Encounters:   03/08/24 61.9 kg (136 lb 7.4 oz)   01/12/24 66.2 kg (146 lb)   12/15/23 66.2 kg (146 lb)   08/15/23 66.2 kg (146 lb)   08/10/23 66.2 kg (146 lb)   05/10/23 66.2 kg (146 lb)   12/02/22 68.5 kg (151 lb)   09/19/22 68 kg (150 lb)   08/24/22 67.1 kg (148 lb)   11/19/21 57.2 kg (126 lb 3.2 oz)       Lab Results   Component Value Date    WBC 8.41 03/08/2024    HGB 14.4 03/08/2024    HCT 45.2 03/08/2024     03/08/2024    CHOL 173 03/08/2024    TRIG 63 03/08/2024    HDL 56 03/08/2024    ALT 10 03/08/2024    AST 18 03/08/2024     03/08/2024    K 4.7 03/08/2024     03/08/2024    CREATININE 0.9 03/08/2024    BUN 10 03/08/2024    CO2 25 03/08/2024    TSH 1.001 03/08/2024    HGBA1C 5.0 03/08/2024       Review of Systems   Constitutional:  Negative for activity change, appetite change, fatigue and fever.   HENT:  Negative for mouth dryness and goiter.    Eyes:  Negative for visual disturbance.   Respiratory:  Negative for apnea, cough, chest tightness and shortness of breath.    Cardiovascular:  Negative for chest pain, palpitations and leg swelling.   Gastrointestinal:  Negative for abdominal pain, constipation, diarrhea, nausea, vomiting and reflux.   Endocrine: Negative for cold intolerance, heat intolerance, polydipsia, polyphagia and polyuria.   Genitourinary:  Negative for frequency and menstrual problem.   Musculoskeletal:  Negative for arthralgias and myalgias.   Integumentary:  Negative for color change and rash.   Neurological:  Negative for dizziness, vertigo, tremors, seizures, syncope, weakness, numbness and headaches.   Psychiatric/Behavioral:  Negative for self-injury, sleep disturbance and suicidal ideas. The patient is not nervous/anxious.        Physical Exam  Constitutional:       General: She is not in acute distress.     Appearance: Normal appearance.    HENT:      Head: Normocephalic and atraumatic.   Eyes:      General: No scleral icterus.  Pulmonary:      Effort: Pulmonary effort is normal. No respiratory distress.   Neurological:      Mental Status: She is alert and oriented to person, place, and time.   Psychiatric:         Mood and Affect: Mood normal.         Behavior: Behavior normal.              Assessment and Plan     1. PCOS (polycystic ovarian syndrome)    2. BMI 24.0-24.9, adult      PCOS (polycystic ovarian syndrome)  BMI 24.0-24.9, adult  Intolerant of metformin  Currently off birth control  Referral [facility] per pt request  Will send note     Low iron:  Pt advised f/u with primary care to address iron supplementation    [facility] for pcos--pt would like to have referral for met clinic  Will fax over    Follow up if symptoms worsen or fail to improve.

## 2024-05-08 ENCOUNTER — PATIENT MESSAGE (OUTPATIENT)
Dept: RESEARCH | Facility: HOSPITAL | Age: 34
End: 2024-05-08
Payer: COMMERCIAL

## 2024-05-10 ENCOUNTER — CLINICAL SUPPORT (OUTPATIENT)
Dept: PRIMARY CARE CLINIC | Facility: CLINIC | Age: 34
End: 2024-05-10
Payer: COMMERCIAL

## 2024-05-10 DIAGNOSIS — E28.2 PCOS (POLYCYSTIC OVARIAN SYNDROME): ICD-10-CM

## 2024-05-10 DIAGNOSIS — R53.83 FATIGUE, UNSPECIFIED TYPE: ICD-10-CM

## 2024-05-10 PROCEDURE — 97802 MEDICAL NUTRITION INDIV IN: CPT | Mod: 95,,, | Performed by: DIETITIAN, REGISTERED

## 2024-05-10 NOTE — PROGRESS NOTES
"Nutrition Assessment    Visit Type: Insurance initial  Session Time:  1 Hour 30 Minutes  Reason for MNT visit: Pt in for education and nutrition counseling regarding  PCOS .   The patient location is:  Patient Home   The chief complaint leading to consultation is: PCOS  Visit type: Virtual visit with synchronous audio and video  Total time spent with patient: 90 minutes  Each patient to whom he or she provides medical services by telemedicine is:  (1) informed of the relationship between the physician and patient and the respective role of any other health care provider with respect to management of the patient; and (2) notified that he or she may decline to receive medical services by telemedicine and may withdraw from such care at any time.   Age: 33 y.o.  Wt:   Wt Readings from Last 10 Encounters:   03/08/24 61.9 kg (136 lb 7.4 oz)   01/12/24 66.2 kg (146 lb)   12/15/23 66.2 kg (146 lb)   08/15/23 66.2 kg (146 lb)   08/10/23 66.2 kg (146 lb)   05/10/23 66.2 kg (146 lb)   12/02/22 68.5 kg (151 lb)   09/19/22 68 kg (150 lb)   08/24/22 67.1 kg (148 lb)   11/19/21 57.2 kg (126 lb 3.2 oz)     Ht:   Ht Readings from Last 1 Encounters:   03/08/24 5' 3" (1.6 m)     BMI:   BMI Readings from Last 5 Encounters:   03/08/24 24.17 kg/m²   01/12/24 26.70 kg/m²   12/15/23 26.70 kg/m²   08/15/23 26.70 kg/m²   08/10/23 26.70 kg/m²       Client states:    5/10/24 Has lost 10 pounds since January. Was recommended to take an iron supplement in March d/t low iron lab values. Very bad migraines, keeps track of migraine journal, food sensitivity journal, GM passed away in late jan. Issues with constipation. States she doesn't eat a whole lot of "junk food" migraines do tend to be stress related. Would like to dive into nutrition to help support her hormones, digestion, and migraines.  Discussed:  Electrolyte sources   Magnesium sources  High fiber foods  Getting adequate protein  Honoring hunger    Medical History  Problem List          "    Resolved    PCOS (polycystic ovarian syndrome)            Past Medical History:   Diagnosis Date    Amenorrhea     Migraines     PCOS (polycystic ovarian syndrome)        Past Surgical History:   Procedure Laterality Date    REMOVAL OF INTRAUTERINE DEVICE (IUD)  12/28/2022    iud - unable to be removed during ultrasound        Medications    Prior to Admission medications    Medication Sig Start Date End Date Taking? Authorizing Provider   butalbital-acetaminophen-caff -40 mg Cap     Provider, Historical   ibuprofen (ADVIL,MOTRIN) 600 MG tablet Take 600 mg by mouth every 6 (six) hours as needed. 12/28/22   Provider, Historical   medroxyPROGESTERone (PROVERA) 10 MG tablet TAKE 1 TABLET(10 MG) BY MOUTH EVERY DAY FOR 14 DAYS 7/17/23   Celio Wilder MD   naproxen sodium (ANAPROX) 550 MG tablet Take 1 tablet (550 mg total) by mouth 3 (three) times daily with meals. 5/10/23   Celio Wilder MD   tiZANidine (ZANAFLEX) 4 MG tablet Take 2-4 mg by mouth every 8 (eight) hours as needed. 10/15/23   Provider, Historical        Vitamins, Minerals, and/or Supplements:  Iron supplement at night after dinner, at least a couple of times per week     Social History    Marital status:      Social History     Tobacco Use    Smoking status: Former     Types: Vaping with nicotine    Smokeless tobacco: Never   Substance Use Topics    Alcohol use: Not Currently       Labs   Reviewed and noted    LIFESTYLE FACTORS    Sleep: poor  Wake: 7 am    Stress Level: high    Support System:  spouse    Physical Activity: lightly active (low intensity, 1-3 days a week)  Types of activity: has been going to the gym consistently x2 years, felt like helped with her stress, but have not been able to since GM passed; 2-4 days per week, weight lifting    Dining out: 3-4 x per week, mostly take out    Frequency of consumption of fried foods: weekly    Meal preparation/shopping: self    Food Allergies or Intolerances:  NKFAI // does  keep a food journal      Beverages  Water: not adequate  Alcohol: none  Coffee: 2-2.5 cups + splash creamers  Energy Drinks: 1-2 a week C4  Tea: none  Soda: Was drinking a lot of Coke at beginning of year  Milk: whole milk     24-hour Recall  Breakfast: skips // boiled eggs or cheese sticks // 1/2 bagel & cream cheese (cinnamon sugar or blueberry)  AM Snack:   Lunch: 12 pm biggest meal of the day sushi // teriyaki bowl // salad // small sliders + fries  PM Snack: dried fruit // boiled egg // strawberries // grapes // Greek yogurt  Dinner: 8-9 pm frozen pizza from [employer] // belen[employer] killer bread sandwiches    Diagnosis    Inadequate fiber intake  related to low fiber food choices as evidenced by 24-hour food recall.    Intervention    Estimated Energy Requirements:   BMR x 1.2 = 1914  Calories: 1900  Protein: 1.5-2.0 g/kg 145 grams   Carbohydrates: 1-3 g/kg 190 grams   Fat: 0.5-1 g/kg 63 grams  Baseline for fluids: 100 ounces + sweat loss    Written Materials Provided  Simple Meal Planning and Healthy Plate  RD contact information    Recommendations:  Lunch get adequate protein at lunch  Fill 1/2 plate with plants  1/4 lean protein  1/4 carbohydrates   Fiber mid morning snack     Monitoring/Evaluation    Monitor the following:  Weight  Sleep  Movement    Communicated with healthcare provider and documented plan for referral to appropriate agency/healthcare provider as needed    Supervising Physician: Brigette Shetty MD    Patient motivation, anticipated barriers, expected compliance: Patient is motivated and has verbalized understanding and intent to comply.     Comprehension: good     Follow-up: 2 months

## 2024-05-10 NOTE — PATIENT INSTRUCTIONS
Name: Bessy Gleler  Date: 05/10/2024  NUTRITION RECOMMENDATIONS    Action Items:  Fill ½ plate with plants especially non-starchy vegetables  ¼ plate with lean protein  ¼ plate with carbohydrate of choice  Focus on getting adequate protein at lunch  Eat a protein focused snack at 10:30 am and 4:00 pm  Great Magnesium food sources: Goal is at least 350 mg of magnesium daily  Abdiel seeds  Almonds/cashews, 1 ounce, 80 mg  Spinach, cooked ½ cup, 78 mg  Soy milk, 1 cup, 61mg  Edamame, ½ cup shelled, 50 mg  Black beans, ½ cup, 60 mg  Peanut butter, 2 tbsp, 49 mg  Brown rice, ½ cup cooked, 42 mg  Potato (with skin), 3.5 oz, 43 mg  Sparta, 3 oz cooked 26 mg  Dark chocolate (60-90%), 1 oz, 50 mg  Bread, whole wheat, 1 slice, 46 mg  Avocado, 1 cup cubed, 44 mg  Additional Recommendations  Limit added sugars to < 25g per day.  Hydration: At least ½ your body weight in ounces per day  Social Media I recommend following for tips and ideas:  Dietitian [facility] Nutritionist  NEWTON Hensley RD  Lean Protein Sources:  Chicken breast  Boneless, skinless chicken thighs  Eggs or egg whites/beaters  Pork tenderloin  93/7 or 90/10 ground beef/turkey/chicken  Sirloin, flank, filet, eye of round steak  Center cut hobbs  Pork chop, thin cut, boneless  Beef jerky  Deli meat (chicken, ham, turkey)  Sparta  Catfish  Tuna, canned in water  Oysters   Crab, shrimp, crawfish  Low-fat dairy, any kind  30 grams of protein cheat sheet:  3.5 ounces cooked chicken breast // 172 calories  4 ounces ground turkey breast // 120 calories  5 ounces [place] strip steak // 325 calories  5 ounces shrimp // 170 calories  6 ounces cod // 145 calories  5 ounces tuna fish // 165 calories  5 ounces salmon // 300 calories  8 ounces extra firm tofu // 250 calories  1.5 scoops protein powder // 160 calories  1.5 cups edamame // 280 calories  2 cups black beans // 485 calories  5 eggs // 390 calories  1.25 cups egg whites or egg beaters // 155  calories  When looking for a lean protein look for protein > fat on the nutrition label if fat > protein it is considered a high fat meat  Non-Starchy Vegetables include all vegetables except:  Potatoes / sweet potatoes  Corn  Peas  Oneal beans  Winter squash such as acorn squash  High Fiber Carbohydrate Sources:  Beans / lentils  Whole grains  Potatoes with skin on   Quinoa  Rice  Peas  Corn  Oneal beans  Carb balance tortillas or flatout wraps  All fruits  Oatmeal   High Fiber Foods  Raspberries, 1 cup, 8 grams  Pear, 1 medium, 5.5 grams  Apple with skin, 1 medium, 4.5  grams   Green peas, 1 cup, 9 grams   Broccoli, 1 cup chopped, 5 grams   [place] sprouts, 1 cup, 4 grams   Whole wheat pasta, 1 cup 6 grams   Quinoa cooked, 1 cup 5 grams   Popcorn, plain 3 cups, 3.5 grams   Lentils, 1 cup. 15.5 grams   Lorraine seeds, 1 tablespoons ,3 grams   Almonds, 1 ounce, 3.5 grams   Black beans, 1/2 cup, 7.5 grams   Marybeth Nhan Delightful bread, 2 slices, 5 grams   Carb balance tortillas, 1 soft taco sized, 15 grams   Minimum goal is 25 grams of fiber per day      Breakfast ideas:  At least 20g protein  Idea #1:  2 hard boiled eggs  2 Veggies Made Great cinnamon roll muffins  3 turkey or chicken breakfast sausage links  1 cup fresh strawberries  Idea #2:  Yogurt Parfait  1 cup Oikos Triple Zero Greek yogurt, any flavor  1 cup mixed berries  ½ cup low sugar cereal (see brand recommendations at end of document)  1 tbsp lorraine seeds  Idea #4:  High Protein Oatmeal  High Protein Overnight Oats    Customizable High Protein Baked Oatmeal  Idea #5:  Basic Protein Smoothie  1 scoop protein powder  1 cup frozen fruit  ½ cup frozen cauliflower rice  8 ounces Fairlife 2% milk or unsweetened soy milk  1 tablespoon lorraine seeds  Optional add-ins: peanut butter or PB2, ground flax seed, coconut flakes, sugar-free pudding mix, handful baby spinach, ½ cup pickled beets  Idea #6:  Breakfast [place]  Whole wheat English muffin  1 egg  2 slices center cut  hobbs or ham or turkey deli meat  1 slice cheese  1 cup cantaloupe    Idea #7:  1 cup Ratio cereal + 1 cup Fairlife 2% milk + ½ cup frozen blueberries  2 turkey sausage patties       Lunch Ideas  Idea #1:  Any salad kit + packet or small can of tuna/salmon or 4-5 ounces of rotisserie chicken (skin pulled off)  Healthy Salad Recipes   Blackened Chicken Solano Salad - Eating Bird Food   Building A Better-For-You Salad   Start with a base of fresh greens: arugula, spinach, butter lettuce, mixed greens, kale, rylie  Add at least 2-3 veggies  Raw veggies add a nice crunch  Grilled or roasted veggies add a nice charred, caramelized flavor  Pickled veggies are great for adding a hint of sweet/sour flavor, while fermented veggies give you added probiotic benefits  Add a lean protein (3-4 ounces)  Grilled chicken, turkey, tuna/salmon packet, shrimp, tofu  Add high fiber carbs (~ ½ cup)  Beans & quinoa   Grains: whole grain, chickpea pasta, farro, bulgar  Starchy veggies: potatoes, winter squash, corn  Fruit: berries, grapes, chopped apples, etc.  Add texture + flavor: choose 1-2, up to ~ 2 tablespoons of each item  Avocado  Cheese  Hummus   Nuts   seeds  Dress it up  Choose light/low-fat options: Grimm's Own Light dressings and Gridium Farms are great options  Make your own: oil + vinegar + seasonings + a little bit of Behzad mustard  Homemade Salad Dressing Recipes    Idea #2:  Build a better sandwich:   2 slices whole grain bread  3-4 ounces deli meat  1 slice cheese or ¼ sliced avocado  unlimited non-starchy vegetables such as lettuce, tomato, onion, pickle, etc.  1 tablespoons sauce (olive oil leslie, [place], lite honey mustard, No Sugar Added BBQ sauce, hummus), unlimited yellow or brown mustard  Side items options:  1 cup of raw non-starchy vegetables (baby carrots, sugar snap peas, cucumbers, etc.) + 1-2 tbsp hummus/tzatziki/light ranch/BBQ  ½ cup cooked non-starchy vegetables  1 serving baked chips such as  Pop Corners, or Pop Chips  1 piece fruit  Idea #3:  Honey Sriracha Roasted [place] Rice Bowls (GF, DF) - Skinnytaste     Idea #4:  Snack Box Lunch  Option 1:  2 boiled eggs  2 ounces deli meat  ½ bell pepper  11 mini pretzels  ½ cup grapes  Light string cheese  Option 2:   1 serving Nut Thins  Light string cheese  Tuna pack, any flavor  1 cup chopped strawberries  Fiber one brownie bar  ½ cup blueberries  Option 3:  1 ounce pretzel crisps  ½ cup apple sliced  2 slices ultra thin provolone cheese  2 ounces rotisserie chicken  4 tbsp powdered peanut butter mixed with water (see directions)  ½ cup blackberries  Option 4:   2 tbsp hummus  3 ounces baby carrots  2 hard boiled eggs  ½ cup grapes  2 ounces deli turkey wrapped around 2 low fat string cheese  Dinner Ideas  Idea #1:  Example: 5 ounces cooked chicken breast + 1 cup wild rice + 1.5 cups green beans all cooked with olive oil spray  4-6 ounces of baked, grilled, air fried, or broiled fish, seafood, or lean meat cooked with spray olive oil  1 cup or more of cooked non-starchy vegetables  About 1 cup of carbohydrates such as: rice, beans, peas, corn, potatoes, pasta  Idea #2:  Build a grain/power bowl:   Choose a starch (1/2-2/3 cup feel free to mix/match)  Brown rice, farro, quinoa, millet, barley, couscous  White or sweet potatoes  [place] squash  Choose a protein (3-4 ounces)  [place], steak (lean cut), chicken, shrimp, edamame, tofu  Vegetables (unlimited amounts)  Try them roasted, steamed, or grilled for variety  Eggplant, broccoli, carrots, bell peppers, green beans, radishes, cucumbers, parsnips, lettuce, cabbage  Dressing/sauce  Vinaigrette, yogurt, warm broth, hot sauce, soy sauce, chimichurri, pesto, or any combinations!  Garnish (optional)  Fresh herbs, micro greens, nuts, seeds, toasted coconut  Note that nuts/seeds/coconut contain a lot of calories and should be used sparingly  Idea #3:  [place]acos  1-2 carb balance tortillas  ¼ cup cheese of choice  2 tbsp  sour cream  3-4 ounces lean (93/7) ground beef or turkey or rotisserie chicken  Optional sides: Cilantro Lime Cauliflower Rice + Black Beans   Idea #4:  Better-for-you pizza  2-Ingredient High Protein Dough, (or use a whole wheat summer) + ¼ cup red sauce + 1/3 cup cheese + 2-4 ounces of protein (rotisserie chicken, turkey pepperoni, sliced grilled chicken/steak) + veggies of choice  Serve with at least 1 cup of non-starchy vegetables or side salad  27 Ways to Use 2-Ingredient Dough   Idea #5:  Better-for-you pasta  1 cup cooked Banza/Whole Wheat/Barilla Protein+ pasta + 2-4 ounces lean protein + ¼ cup monico sauce or ½ cup red sauce or 1-2 tablespoons pesto   Serve with 1-2 cups non-starchy vegetables  Idea #6:  Burger Night  4 ounces 93/7 ground beef or turkey jennifer  1 whole wheat hamburger bun  1 slice cheese  1 tbsp ketchup  1 tbsp olive oil leslie  Serve with at least 1 cup non-starchy vegetables such as: 20-Minute (Crisp-Tender!) Air Fryer Frozen Broccoli - Real Simple Good     Snack Ideas  Create a hunger crushing combo with protein, fiber, and fat! Choose two of the three to create a satisfying and filling snack any time of the day!  Oikos Pro 20g Greek Yogurt + piece of fruit  1 portion bag of nuts + piece of fruit  Piece of fruit + 2 tablespoons no sugar added nut butter  Protein bar  Look for at least 15 grams of protein  Limit to 1 serving carbohydrates (15 grams)  Ready to drink protein shake   At least 25 grams protein  Bag of Skinny Pop or Smart Food popcorn + 1 low-fat cheese stick/babybel   ½ cup cottage cheese + ¼ cup freeze dried fruit  Any smoothie under 300 calories and 15+ grams of protein  Turkey rollup: 1 carb balance tortilla + 3 oz turkey + 1 tablespoon leslie  3 oz can or packet of tuna + 1 tablespoon olive leslie with 1 serving of crackers of your choice  Bare Apple Chips + 2 low-fat string cheese  1-2 Chomps Sticks + 1 piece fruit  2 light cheese stick + 2-4 slices deli meat + 1 pickle  Sweet  Treats  Protein shake  ½ cup berries + a dollop of whipped cream  Outshine Frozen Fruit Bars (2-3 if wanted)  Chocolate Dipped Banana Bites  Raspberry Lemon Greek Frozen Yogurt Bark  1 cup strawberries with 2-3 tbsp chocolate hummus  Any smoothie under 200 calories  Sugar Free Pudding or Jello  5.3 ounce 0% Fage yogurt mixed with 1-2 tablespoons sugar-free pudding mix  All the Best Banana Nice Cream Recipes   Any sweet treat under 200 calories (think mini/fun-sized)    Favorite Websites for Recipe Inspiration  Skinnytastsalomón  Real Food Dietitians   Eating Well  Budget Bytes  Eating Bird Food  Fit Foodie Finds  Oh Snap Macros   35 [place] Friendly Meal Prep Recipes   9 Meal Prep Success Tips       Additional Recipe Ideas:  Breakfast  Oats Overnight + 6 ounces Fairlife 2% milk   Breakfast Toast Ideas + Protein Shake  Meal Prep Breakfast Options:  Wetmore Chicken Egg Cups  Ham & Broccoli Breakfast Casserole  Bagel Ham and Cheese Quiche  Blueberry Protein Pancakes  English Muffin Breakfast Bake     Raspberry Fiber Smoothie (makes 1 smoothie)  1 cup frozen raspberries  1 cup unsweetened almond milk  2/3 cup nonfat vanilla Greek yogurt (or 5.3 oz cup) or 1 scoop protein powder  ½ cup frozen cauliflower (florets or riced)  1 tbsp almond butter  1 tbsp old fashioned oats  1 tbsp lorraine seeds  ½ tbsp honey  PB&J Smoothie Bowl  ¼ cup milk of your choice  2/3 cup frozen blueberries  2/3 cup sliced strawberries, frozen  1 scoop vanilla protein powder  1 tbsp peanut butter  Optional toppings: 1 tbsp melted peanut butter, 1-2 tbsp granola, lorraine seeds, blue berries  Spinach Avocado Smoothie  1 cup nonfat plain Greek yogurt  1 cup fresh spinach  1 frozen banana  ¼ avocado  2 tbsp water  1 tsp honey  Lunch/Dinner  Chicken Enchiladas - great for freezing serving with a side salad or at least 1 cup non-starchy vegetables  Ground Turkey Skillet with Zucchini, Corn, Black Beans, and Tomato great by itself or serve over cauliflower rice or  brown rice    Air Fryer Chicken Thighs with Air Fryer Frozen Broccoli & 1 cup cubed roasted potatoes  BBQ Grilled chicken breast with Brown Sugar Baked Beans + 1 cup non-starchy vegetables   Juicy Chicken Breast 6 Ways   30 Sheet Pan Dinners    Chicken Fajita Kabobs serve with brown rice, quinoa, or carb smart tortillas + additional non-starchy vegetables  Pasta Primavera add in additional protein such as shrimp, sliced steak, ground beef, or rotisserie chicken    Sheet Pan Turkey Meatloaf + Broccoli serve with 1 whole baked potato and ¼ cup reduced fat cheese  Ranch Chicken Meal Prep  Restaurant Tips   Pick 1 out of the 4 Rule: Instead of eating bread/tortilla chips, an appetizer, alcoholic drink and dessert, choose just one to have with your entrée   Focus on lean proteins: Refer to lean meat/meat substitutes page in meal planning guidebook. Select items grilled, baked, broiled, braised, poached or roasted   For your heart health, avoid crispy, crunchy, breaded, paneed or stuffed items and items that are cream based, au gratin or buttered   Order sauces, dressings, and gravies on the side. This way you can add 1-2 tablespoons yourself. This helps with portion control    Request extra non-starchy vegetables instead of a starchy side dish. If the starchy side is something you love, consider splitting it with someone else at the table   Beverages: Order water with lemon, sparkling water, or unsweetened tea. Avoid sugary soft drinks, juices and mixers   Deep fried foods: Enjoy no more than 2x/month    Favorite Brands  Protein Bars/Shakes/Powders  Bars  RX Bars  Fit Crunch  THINK  Quest  One  Shakes  Core Power Shakes (26g or 42g protein)  Fairlife Nutrition Plan (30g protein)   Ensure Max Protein (30g protein)  Premier Protein Shakes (30g protein)  Boost High Protein (20g protein)   Muscle Milk Genuine Protein Shakes (25g protein)   Quest Protein Shakes (30g protein)   Orgain Protein Shakes (20-26g  protein)   Powders  Ghost  Optimum Nutrition   Muscle Milk  Garden of Life  Andrade   Naked   Pasta/Rice  Banza Chickpea Pasta  Barilla Whole Wheat  Parish Rice  Success Boil in Bag Brown Rice  Bread  Anton's Killer Bread thin sliced  Nature's Own 100% Whole Grain Sugar-Free  Orowheat Whole Wheat Small Slice  Marybeth Nhan Delightful White Whole Wheat  Ledbetter Carb Balance Tortillas  Ole Extreme Wellness Tortillas  Flatout Wraps  La Banderita Carb Counter Tortillas  Toufayan Keto Wraps  Dairy  Chobani less sugar  Dannon Light and Fit Yogurt  Fage 2% plain  Oikos Triple Zero  Fairlife 2% Milk  Ripple Milk, unsweetened(milk alternative)  Soy Milk, Vanilla Unsweetened  Good Culture Low-Fat Cottage Cheese  Dressings/Sauces  [place] Greek Yogurt Ranch   Sweet Baby Ray's BBQ Sauce - no added sugar  Any ketchup with no added/less added sugar  Hunts or Pk or Primal Kitchen  G Hagen Sugar Free Sauces   Grimm's Own Dressings   Shyla's Dressings   Cereal  Kashi GO!  Ratio  Shredded Wheat  Fiber One  Grape Nuts  Special K Protein  Snacks  Nature's Garden Trail Mix Snack Packs  Pop Chips (like healthier Lays)  Pop Corners (like healthier Doritos)  Skinny Pop Popcorn  Lesser Evil Popcorn  Beanitos Chips  Jain Oats Rice Crisps   Late July Adbiel & Quinoa Tortilla Chips  Great Value Zero Sugar Beef Jerky (be mindful of sodium)  Chomps Meat Sticks        [facility] Eat Fit    Designed to take the guesswork out of dining out healthfully, [employer] Fit makes the healthy choice the easy choice   Visit [email or web address] Bells    Download the [facility] Eat Fit sherry for free on your smartphone   Lists of all Eat Fit restaurant partners & dishes by location   Nutrition facts included for all Eat Fit dishes   200+ Eat Fit approved recipes   Eat Fit shopping guide + community wellness resources   Eat Fit Cookbook   Craft: The Eat Fit Guide to Zero Proof Cocktails     K.I.S.S. (Keep It Simple Silly)  Pick 2-3 non-starchy vegetables  Rotate during  the week having them for lunches and dinners.  Pick 1-2 different types of meats  Use different sauces   BBQ  [place]  Teriyaki  Pesto  Salsa  Seasonings  Slap Ya Mama  Lemon Pepper  Everything but the Bagel  Nunez's Chipotle Cherry  Pick 2 different starches to have during the week  For example:  Potatoes  sweet potatoes  Corn  butternut squash  Banza chickpea pasta  brown rice  Keep breakfast simple, have two options and stick with those two options the whole week  For example, making Overnight Oats with a serving of fruit for 3 breakfasts and eat 2 Healthy Egg Muffin Cups with a slice of whole grain toast and 1 serving fruit for the other 4 breakfasts    For lunch/dinner you could do something simple like the following: This would allow you to not waste a lot of ingredients but have a wide variety of flavors that you could mix match all week.  Chicken thighs 3 ways:   BBQ Sauce  Lemon Pepper Seasoning  Ranch Seasoning  Potatoes 2 ways:   cubed and roasted  mashed with low-fat milk, Greek yogurt (instead of butter or sour cream) and low-fat cheese  Brown rice - made in chicken stock, low sodium (gives it extra flavor)  Frozen Broccoli with ranch seasoning, microwaved  Frozen broccoli roasted, sprinkled with lemon juice and 1 tbsp parmesan  Green beans with sliced mushrooms  Green beans sauteed with garlic  15-Minute Meals  Two slices whole wheat bread + frozen turkey burger + microwave broccoli  Chickpea pasta + jar tomato sauce + chicken sausage + side salad kit  Microwave rice + rotisserie chicken + frozen veggie mix + teriyaki sauce + chopped peanuts  Dwaine grilled chicken rotisserie or blackened grilled chicken tenders + frozen garlic bread + baby carrots + hummus  Stir Prieto Chicken - make with Banza pasta + add in double vegetables   Shrimp + BBQ sauce + canned corn + canned beans + microwave vegetables  Can tuna/salmon/chicken + 2 tablespoons Greek yogurt/avocado oil leslie + 1 serving Nut Thins + apple +  cheese stick

## 2024-05-23 DIAGNOSIS — E28.2 PCOS (POLYCYSTIC OVARIAN SYNDROME): Primary | ICD-10-CM

## 2024-05-23 DIAGNOSIS — R53.83 FATIGUE, UNSPECIFIED TYPE: ICD-10-CM

## 2024-06-28 ENCOUNTER — PATIENT MESSAGE (OUTPATIENT)
Dept: PRIMARY CARE CLINIC | Facility: CLINIC | Age: 34
End: 2024-06-28
Payer: COMMERCIAL

## 2024-10-18 ENCOUNTER — OFFICE VISIT (OUTPATIENT)
Dept: PODIATRY | Facility: CLINIC | Age: 34
End: 2024-10-18
Payer: COMMERCIAL

## 2024-10-18 DIAGNOSIS — M79.672 LEFT FOOT PAIN: Primary | ICD-10-CM

## 2024-10-18 DIAGNOSIS — G57.62 MORTON'S NEUROMA OF THIRD INTERSPACE OF LEFT FOOT: ICD-10-CM

## 2024-10-18 PROCEDURE — 99999 PR PBB SHADOW E&M-EST. PATIENT-LVL III: CPT | Mod: PBBFAC,,, | Performed by: PODIATRIST

## 2024-10-18 RX ORDER — DEXAMETHASONE SODIUM PHOSPHATE 4 MG/ML
4 INJECTION, SOLUTION INTRA-ARTICULAR; INTRALESIONAL; INTRAMUSCULAR; INTRAVENOUS; SOFT TISSUE
Status: DISCONTINUED | OUTPATIENT
Start: 2024-10-18 | End: 2024-10-18 | Stop reason: HOSPADM

## 2024-10-18 RX ORDER — TRIAMCINOLONE ACETONIDE 40 MG/ML
40 INJECTION, SUSPENSION INTRA-ARTICULAR; INTRAMUSCULAR
Status: DISCONTINUED | OUTPATIENT
Start: 2024-10-18 | End: 2024-10-18 | Stop reason: HOSPADM

## 2024-10-18 RX ORDER — BUPIVACAINE HYDROCHLORIDE 5 MG/ML
2 INJECTION, SOLUTION PERINEURAL
Status: DISCONTINUED | OUTPATIENT
Start: 2024-10-18 | End: 2024-10-18 | Stop reason: HOSPADM

## 2024-10-18 RX ADMIN — DEXAMETHASONE SODIUM PHOSPHATE 4 MG: 4 INJECTION, SOLUTION INTRA-ARTICULAR; INTRALESIONAL; INTRAMUSCULAR; INTRAVENOUS; SOFT TISSUE at 02:10

## 2024-10-18 RX ADMIN — BUPIVACAINE HYDROCHLORIDE 2 ML: 5 INJECTION, SOLUTION PERINEURAL at 02:10

## 2024-10-18 RX ADMIN — TRIAMCINOLONE ACETONIDE 40 MG: 40 INJECTION, SUSPENSION INTRA-ARTICULAR; INTRAMUSCULAR at 02:10

## 2024-10-18 NOTE — PROCEDURES
Neuroma injection    Date/Time: 10/18/2024 2:15 PM    Performed by: Padmini Grajeda DPM  Authorized by: Padmini Grajeda DPM    Consent Done?:  Yes (Verbal)  Indications:  Diagnostic evaluation and pain  Site marked: the procedure site was marked    Timeout: prior to procedure the correct patient, procedure, and site was verified    Prep: patient was prepped and draped in usual sterile fashion      Local anesthesia used?: Yes   Location Left Foot:  Third Webspace  Needle size:  25 G  Approach:  Dorsal  Medications:  4 mg dexAMETHasone 4 mg/mL; 40 mg triamcinolone acetonide 40 mg/mL; 2 mL BUPivacaine 0.5 % (5 mg/mL)  Patient tolerance:  Patient tolerated the procedure well with no immediate complications

## 2024-10-18 NOTE — PROGRESS NOTES
Subjective:       Patient ID: Bessy Geller is a 34 y.o. female.    Chief Complaint: Foot Pain (Patient complains of 4/10 pain at present to left lateral foot. )    HPI: Bessy Geller presents to the clinic today, concerning discomfort at the left.  Patient states mild to moderate pains been recalcitrant to oral Tylenol or NSAID therapy. Rates the pains at approx. 8/10, at least. States pains are sharp in nature, most of the time, especially with weightbearing. When not bearing weight, pains are described as achy.  Did get a proximally 10 months of relief with previous injection.  Yaya Malcolm Jr., MD is patient's primary care provider.     Review of patient's allergies indicates:   Allergen Reactions    Cockroach     Mold     Penicillins        Past Medical History:   Diagnosis Date    Amenorrhea     Migraines     PCOS (polycystic ovarian syndrome)        Family History   Problem Relation Name Age of Onset    Rheum arthritis Maternal Grandmother Megan Leonard     Osteoarthritis Maternal Grandmother Megan Leonard        Social History     Socioeconomic History    Marital status:    Tobacco Use    Smoking status: Former     Types: Vaping with nicotine    Smokeless tobacco: Never   Substance and Sexual Activity    Alcohol use: Not Currently    Drug use: Never    Sexual activity: Yes     Partners: Male     Birth control/protection: I.U.D.     Comment: would like IUD removal, discuss pre-conception     Social Drivers of Health     Financial Resource Strain: Low Risk  (3/6/2024)    Overall Financial Resource Strain (CARDIA)     Difficulty of Paying Living Expenses: Not very hard   Food Insecurity: No Food Insecurity (3/6/2024)    Hunger Vital Sign     Worried About Running Out of Food in the Last Year: Never true     Ran Out of Food in the Last Year: Never true   Transportation Needs: No Transportation Needs (3/6/2024)    PRAPARE - Transportation     Lack of Transportation (Medical): No     Lack of  Transportation (Non-Medical): No   Physical Activity: Insufficiently Active (3/6/2024)    Exercise Vital Sign     Days of Exercise per Week: 3 days     Minutes of Exercise per Session: 20 min   Stress: Stress Concern Present (3/6/2024)    [place] [employer] - Occupational Stress Questionnaire     Feeling of Stress : Very much   Housing Stability: Low Risk  (3/6/2024)    Housing Stability Vital Sign     Unable to Pay for Housing in the Last Year: No     Number of Places Lived in the Last Year: 1     Unstable Housing in the Last Year: No       Past Surgical History:   Procedure Laterality Date    REMOVAL OF INTRAUTERINE DEVICE (IUD)  12/28/2022    iud - unable to be removed during ultrasound       Review of Systems       Objective:   There were no vitals taken for this visit.    X-Ray Foot Complete Left  Narrative: EXAMINATION:  XR FOOT COMPLETE 3 VIEW LEFT    CLINICAL HISTORY:  .  Pain in left foot    TECHNIQUE:  AP, lateral and oblique views of the left foot were performed.    COMPARISON:  08/15/2023    FINDINGS:  There is no radiographic evidence of acute osseous, articular, or soft tissue abnormality.  Joint spaces are well preserved.  No erosive changes demonstrated.  Impression: No acute findings    Electronically signed by: Froy Bright MD  Date:    01/12/2024  Time:    09:45       Physical Exam    LOWER EXTREMITY PHYSICAL EXAMINATION    DERMATOLOGY: Skin is supple, dry and intact. No ecchymosis is noted. No hypertrophic skin formation. No erythema or cellulitis is noted.    VASCULAR: Pulses are palpable to the B/L lower extremity. The right dorsalis pedis pulse is 2/4 and the posterior tibial pulse is 2/4. The left dorsalis pedis pulse is 2/4 and the posterior tibial pulse is 2/4. Hair growth is noted on the dorsal foot and digits. Proximal to distal, warm to warm. Capillary refill time is WNL at less than 3s.    ORTHOPEDIC:  Mild pain on palpation of the left 3rd intermetatarsal  space.  Positive Anisha's click noted.  No evidence of hammertoe were metatarsalgia.    NEUROLOGY: Protective sensation is intact via 5.07 Pisgah Estuardo monofilament. Proprioception is intact. Sensation to light touch is intact. Vibratory sensation is WNL.  Positive Anisha's.    Assessment:     1. Left foot pain    2. 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  We discussed having inflammation to the common plantar digital nerves likely being associated with repetitive trauma and/or repetitive microtrauma associated with excessive motion at the metatarsal heads causing inflammation to the nerve.  We discussed treatment options regarding metatarsal padding, offloading, anti-inflammatories, and/or intermetatarsal space cortical steroid injection to provide pain relief.     Patient relates interest in steroid injection at this time.  Did discuss risk of steroid use as relates to increased anxiety, insomnia, post injection steroid flares, elevated blood sugars, swelling, pigment/skin changes.  Patient relates understanding.  Recommend use of anti-inflammatories with icing to decrease risk of a post-injection steroid flare.      Future Appointments   Date Time Provider Department Center   11/1/2024 10:30 AM Edwige Retana MD [facility] [place] [facility]

## 2024-10-19 ENCOUNTER — HOSPITAL ENCOUNTER (EMERGENCY)
Facility: HOSPITAL | Age: 34
Discharge: HOME | End: 2024-10-19
Attending: EMERGENCY MEDICINE
Payer: COMMERCIAL

## 2024-10-19 VITALS
OXYGEN SATURATION: 97 % | BODY MASS INDEX: 24.42 KG/M2 | WEIGHT: 137.81 LBS | HEIGHT: 63 IN | RESPIRATION RATE: 18 BRPM | TEMPERATURE: 98 F | DIASTOLIC BLOOD PRESSURE: 90 MMHG | SYSTOLIC BLOOD PRESSURE: 139 MMHG | HEART RATE: 97 BPM

## 2024-10-19 DIAGNOSIS — N39.0 URINARY TRACT INFECTION WITHOUT HEMATURIA, SITE UNSPECIFIED: Primary | ICD-10-CM

## 2024-10-19 LAB
ALBUMIN SERPL BCP-MCNC: 4.8 G/DL (ref 3.5–5.2)
ALP SERPL-CCNC: 88 U/L (ref 40–150)
ALT SERPL W/O P-5'-P-CCNC: 11 U/L (ref 10–44)
ANION GAP SERPL CALC-SCNC: 12 MMOL/L (ref 8–16)
AST SERPL-CCNC: 18 U/L (ref 10–40)
B-HCG UR QL: NEGATIVE
BACTERIA #/AREA URNS HPF: NORMAL /HPF
BASOPHILS # BLD AUTO: 0.05 K/UL (ref 0–0.2)
BASOPHILS NFR BLD: 0.4 % (ref 0–1.9)
BILIRUB SERPL-MCNC: 0.4 MG/DL (ref 0.1–1)
BILIRUB UR QL STRIP: NEGATIVE
BUN SERPL-MCNC: 10 MG/DL (ref 6–20)
CALCIUM SERPL-MCNC: 9.7 MG/DL (ref 8.7–10.5)
CHLORIDE SERPL-SCNC: 107 MMOL/L (ref 95–110)
CLARITY UR: CLEAR
CO2 SERPL-SCNC: 19 MMOL/L (ref 23–29)
COLOR UR: YELLOW
CREAT SERPL-MCNC: 1.1 MG/DL (ref 0.5–1.4)
DIFFERENTIAL METHOD BLD: ABNORMAL
EOSINOPHIL # BLD AUTO: 0 K/UL (ref 0–0.5)
EOSINOPHIL NFR BLD: 0.2 % (ref 0–8)
ERYTHROCYTE [DISTWIDTH] IN BLOOD BY AUTOMATED COUNT: 12.4 % (ref 11.5–14.5)
EST. GFR  (NO RACE VARIABLE): >60 ML/MIN/1.73 M^2
GLUCOSE SERPL-MCNC: 96 MG/DL (ref 70–110)
GLUCOSE UR QL STRIP: NEGATIVE
HCT VFR BLD AUTO: 41.9 % (ref 37–48.5)
HGB BLD-MCNC: 14.4 G/DL (ref 12–16)
HGB UR QL STRIP: ABNORMAL
IMM GRANULOCYTES # BLD AUTO: 0.04 K/UL (ref 0–0.04)
IMM GRANULOCYTES NFR BLD AUTO: 0.3 % (ref 0–0.5)
KETONES UR QL STRIP: NEGATIVE
LEUKOCYTE ESTERASE UR QL STRIP: NEGATIVE
LYMPHOCYTES # BLD AUTO: 3.1 K/UL (ref 1–4.8)
LYMPHOCYTES NFR BLD: 24.5 % (ref 18–48)
MCH RBC QN AUTO: 29.3 PG (ref 27–31)
MCHC RBC AUTO-ENTMCNC: 34.4 G/DL (ref 32–36)
MCV RBC AUTO: 85 FL (ref 82–98)
MICROSCOPIC COMMENT: NORMAL
MONOCYTES # BLD AUTO: 1 K/UL (ref 0.3–1)
MONOCYTES NFR BLD: 7.7 % (ref 4–15)
NEUTROPHILS # BLD AUTO: 8.5 K/UL (ref 1.8–7.7)
NEUTROPHILS NFR BLD: 66.9 % (ref 38–73)
NITRITE UR QL STRIP: POSITIVE
NRBC BLD-RTO: 0 /100 WBC
PH UR STRIP: 6 [PH] (ref 5–8)
PLATELET # BLD AUTO: 297 K/UL (ref 150–450)
PMV BLD AUTO: 11.2 FL (ref 9.2–12.9)
POTASSIUM SERPL-SCNC: 3.1 MMOL/L (ref 3.5–5.1)
PROT SERPL-MCNC: 8.3 G/DL (ref 6–8.4)
PROT UR QL STRIP: NEGATIVE
RBC # BLD AUTO: 4.91 M/UL (ref 4–5.4)
RBC #/AREA URNS HPF: 0 /HPF (ref 0–4)
SODIUM SERPL-SCNC: 138 MMOL/L (ref 136–145)
SP GR UR STRIP: 1 (ref 1–1.03)
URN SPEC COLLECT METH UR: ABNORMAL
UROBILINOGEN UR STRIP-ACNC: NEGATIVE EU/DL
WBC # BLD AUTO: 12.71 K/UL (ref 3.9–12.7)

## 2024-10-19 PROCEDURE — 96374 THER/PROPH/DIAG INJ IV PUSH: CPT

## 2024-10-19 PROCEDURE — 63600175 PHARM REV CODE 636 W HCPCS

## 2024-10-19 PROCEDURE — 80053 COMPREHEN METABOLIC PANEL: CPT

## 2024-10-19 PROCEDURE — 81000 URINALYSIS NONAUTO W/SCOPE: CPT

## 2024-10-19 PROCEDURE — 99285 EMERGENCY DEPT VISIT HI MDM: CPT | Mod: 25

## 2024-10-19 PROCEDURE — 25000003 PHARM REV CODE 250

## 2024-10-19 PROCEDURE — 96375 TX/PRO/DX INJ NEW DRUG ADDON: CPT

## 2024-10-19 PROCEDURE — 85025 COMPLETE CBC W/AUTO DIFF WBC: CPT

## 2024-10-19 PROCEDURE — 81025 URINE PREGNANCY TEST: CPT

## 2024-10-19 RX ORDER — FLUCONAZOLE 150 MG/1
150 TABLET ORAL DAILY
Qty: 1 TABLET | Refills: 0 | Status: SHIPPED | OUTPATIENT
Start: 2024-10-19 | End: 2024-10-20

## 2024-10-19 RX ORDER — KETOROLAC TROMETHAMINE 30 MG/ML
15 INJECTION, SOLUTION INTRAMUSCULAR; INTRAVENOUS
Status: COMPLETED | OUTPATIENT
Start: 2024-10-19 | End: 2024-10-19

## 2024-10-19 RX ORDER — ONDANSETRON HYDROCHLORIDE 2 MG/ML
4 INJECTION, SOLUTION INTRAVENOUS
Status: COMPLETED | OUTPATIENT
Start: 2024-10-19 | End: 2024-10-19

## 2024-10-19 RX ORDER — DICLOFENAC SODIUM 50 MG/1
50 TABLET, DELAYED RELEASE ORAL 2 TIMES DAILY
Qty: 10 TABLET | Refills: 0 | Status: SHIPPED | OUTPATIENT
Start: 2024-10-19 | End: 2024-10-24

## 2024-10-19 RX ORDER — CEFPODOXIME PROXETIL 100 MG/1
100 TABLET, FILM COATED ORAL 2 TIMES DAILY
Qty: 14 TABLET | Refills: 0 | Status: SHIPPED | OUTPATIENT
Start: 2024-10-19 | End: 2024-10-26

## 2024-10-19 RX ORDER — POTASSIUM CHLORIDE 20 MEQ/1
40 TABLET, EXTENDED RELEASE ORAL ONCE
Status: COMPLETED | OUTPATIENT
Start: 2024-10-19 | End: 2024-10-19

## 2024-10-19 RX ADMIN — KETOROLAC TROMETHAMINE 15 MG: 30 INJECTION, SOLUTION INTRAMUSCULAR at 10:10

## 2024-10-19 RX ADMIN — POTASSIUM CHLORIDE 40 MEQ: 1500 TABLET, EXTENDED RELEASE ORAL at 10:10

## 2024-10-19 RX ADMIN — ONDANSETRON 4 MG: 2 INJECTION INTRAMUSCULAR; INTRAVENOUS at 08:10

## 2024-10-19 NOTE — Clinical Note
"Bessy"Arpit Geller was seen and treated in our emergency department on 10/19/2024.  She may return to work on 10/21/2024.       If you have any questions or concerns, please don't hesitate to call.      Tone Bustamante, SERGIO"

## 2024-10-20 NOTE — ED PROVIDER NOTES
Encounter Date: 10/19/2024       History     Chief Complaint   Patient presents with    Flank Pain     Left flank pain,  pcp dx uti 2 weeks completed antibiotic symptoms started back with fever last night, + nausea     34-year-old female presents to the emergency department chief complaint of flank pain.  The flank pain is located in the left side.  The pain began about 3 weeks ago.  The pain has been intermittent.  The pain has been worsening.  Reports that she was seen by her primary care provider and placed on Bactrim for urinary tract infection about 3 weeks ago when the symptoms began.  Patient reports that these symptoms resolved after about 5 days of the Bactrim.  Reports the symptoms returned soon after that and has been intermittent since.  She reports associated nausea.  She denies any fevers, chills, urinary complaints, vaginal bleeding, vaginal discharge, headaches, nausea, vomiting, abdominal pain, chest pain, shortness for breath, or any other symptoms.        Review of patient's allergies indicates:   Allergen Reactions    Cockroach     Mold     Penicillins Other (See Comments)     Yeast infections     Past Medical History:   Diagnosis Date    Amenorrhea     Migraines     PCOS (polycystic ovarian syndrome)      Past Surgical History:   Procedure Laterality Date    REMOVAL OF INTRAUTERINE DEVICE (IUD)  12/28/2022    iud - unable to be removed during ultrasound     Family History   Problem Relation Name Age of Onset    Rheum arthritis Maternal Grandmother Megan Leonard     Osteoarthritis Maternal Grandmother Megan Leonard      Social History     Tobacco Use    Smoking status: Former     Types: Vaping with nicotine    Smokeless tobacco: Never   Substance Use Topics    Alcohol use: Not Currently    Drug use: Never     Review of Systems   Constitutional:  Negative for chills, fatigue and fever.   HENT:  Negative for sore throat.    Respiratory:  Negative for shortness of breath.    Cardiovascular:  Negative  for chest pain.   Gastrointestinal:  Positive for nausea. Negative for abdominal pain, diarrhea and vomiting.   Genitourinary:  Positive for flank pain. Negative for decreased urine volume, difficulty urinating, dysuria, frequency, pelvic pain, vaginal bleeding and vaginal discharge.   Musculoskeletal:  Negative for back pain.   Skin:  Negative for rash.   Neurological:  Negative for dizziness, weakness and headaches.   Hematological:  Does not bruise/bleed easily.       Physical Exam     Initial Vitals [10/19/24 2010]   BP Pulse Resp Temp SpO2   (!) 139/90 97 18 98.1 °F (36.7 °C) 97 %      MAP       --         Physical Exam    Nursing note and vitals reviewed.  Constitutional: She appears well-developed and well-nourished.  Non-toxic appearance. She does not have a sickly appearance. She does not appear ill. No distress.   HENT:   Head: Normocephalic and atraumatic.   Right Ear: Hearing normal.   Left Ear: Hearing normal.   Nose: Nose normal. Mouth/Throat: Uvula is midline and oropharynx is clear and moist.   Eyes: Conjunctivae, EOM and lids are normal. Pupils are equal, round, and reactive to light.   Neck: Trachea normal. Neck supple.   Normal range of motion.   Full passive range of motion without pain.     Cardiovascular:  Normal rate, regular rhythm, normal heart sounds, intact distal pulses and normal pulses.           Pulmonary/Chest: Effort normal and breath sounds normal.   Abdominal: Abdomen is soft and flat. Bowel sounds are normal. There is abdominal tenderness in the left lower quadrant.   There is left CVA tenderness. There is no rebound, no guarding, no tenderness at McBurney's point and negative Awad's sign. negative Rovsing's sign  Musculoskeletal:         General: Normal range of motion.      Cervical back: Normal, full passive range of motion without pain, normal range of motion and neck supple.     Neurological: She is alert and oriented to person, place, and time. She has normal strength and  normal reflexes. No cranial nerve deficit or sensory deficit. GCS eye subscore is 4. GCS verbal subscore is 5. GCS motor subscore is 6.   Skin: Skin is warm, dry and intact. Capillary refill takes less than 2 seconds.   Psychiatric: She has a normal mood and affect. Her behavior is normal. Thought content normal.         ED Course   Procedures  Labs Reviewed   CBC W/ AUTO DIFFERENTIAL - Abnormal       Result Value    WBC 12.71 (*)     RBC 4.91      Hemoglobin 14.4      Hematocrit 41.9      MCV 85      MCH 29.3      MCHC 34.4      RDW 12.4      Platelets 297      MPV 11.2      Immature Granulocytes 0.3      Gran # (ANC) 8.5 (*)     Immature Grans (Abs) 0.04      Lymph # 3.1      Mono # 1.0      Eos # 0.0      Baso # 0.05      nRBC 0      Gran % 66.9      Lymph % 24.5      Mono % 7.7      Eosinophil % 0.2      Basophil % 0.4      Differential Method Automated     COMPREHENSIVE METABOLIC PANEL - Abnormal    Sodium 138      Potassium 3.1 (*)     Chloride 107      CO2 19 (*)     Glucose 96      BUN 10      Creatinine 1.1      Calcium 9.7      Total Protein 8.3      Albumin 4.8      Total Bilirubin 0.4      Alkaline Phosphatase 88      AST 18      ALT 11      eGFR >60      Anion Gap 12     URINALYSIS, REFLEX TO URINE CULTURE - Abnormal    Specimen UA Urine, Clean Catch      Color, UA Yellow      Appearance, UA Clear      pH, UA 6.0      Specific Gravity, UA 1.005      Protein, UA Negative      Glucose, UA Negative      Ketones, UA Negative      Bilirubin (UA) Negative      Occult Blood UA Trace (*)     Nitrite, UA Positive (*)     Urobilinogen, UA Negative      Leukocytes, UA Negative      Narrative:     Specimen Source->Urine   PREGNANCY TEST, URINE RAPID    Preg Test, Ur Negative      Narrative:     Specimen Source->Urine   URINALYSIS MICROSCOPIC    RBC, UA 0      Bacteria Occasional      Microscopic Comment SEE COMMENT      Narrative:     Specimen Source->Urine   HIV 1 / 2 ANTIBODY   HEPATITIS C ANTIBODY   HEP C VIRUS  HOLD SPECIMEN          Imaging Results              CT Abdomen Pelvis  Without Contrast (Final result)  Result time 10/19/24 21:54:16      Final result by Sarah Ibarra MD (10/19/24 21:54:16)                   Impression:      No obstructive uropathy or other acute finding      Electronically signed by: Sarah Ibarra  Date:    10/19/2024  Time:    21:54               Narrative:    EXAMINATION:  CT ABDOMEN PELVIS WITHOUT CONTRAST    CLINICAL HISTORY:  Flank pain, kidney stone suspected;    TECHNIQUE:  Low dose axial images, sagittal and coronal reformations were obtained from the lung bases to the pubic symphysis.  Contrast was not administered.    COMPARISON:  None    FINDINGS:  Kidneys without hydronephrosis or sizable calcification.    Urinary bladder decompressed    Unenhanced liver pancreas and spleen unremarkable.    No signs of appendicitis or obstructive bowel findings                                       Medications   ondansetron injection 4 mg (4 mg Intravenous Given by Other 10/19/24 2057)   ketorolac injection 15 mg (15 mg Intravenous Given 10/19/24 2219)   potassium chloride SA CR tablet 40 mEq (40 mEq Oral Given 10/19/24 2219)     Medical Decision Making  Mildly elevated white blood cell count likely due to recent urinary tract infection.  Patient is still has nitrites in her urine after completion of Bactrim antibiotic.  The patient will be placed on another antibiotic due to continued symptoms.  The CT scan of her abdomen reveals no renal stones, hydronephrosis, or findings indicative of pyelonephritis.  Discussed all findings with the patient.  The patient verbalized understanding.  She will also be given potassium here in the emergency department due to hypokalemia.  Patient reports that she is allergic to penicillins, unable to tell me if she has ever taken cephalosporins before.  Reports that her penicillin allergy allergy is yeast infection.  The patient will be discharged home with a  cephalosporin for treatment of UTI, she is also given a prescription for Diflucan if she does develop a yeast infection.  The patient verbalized understanding.  She will follow up with her primary care provider, a referral was sent for Urology per patient request.  Patient will follow up with Urology next week.  She will return to the emergency department with any new or worsening symptoms.    I discussed with patient and/or family/caretaker that evaluation in the ED does not suggest any emergent or life threatening medical conditions requiring immediate intervention beyond what was provided in the ED, and I believe patient is safe for discharge. Regardless, an unremarkable evaluation in the ED does not preclude the development or presence of a serious of life threatening condition. As such, patient was instructed to return immediately for any worsening or change in current symptoms.     Amount and/or Complexity of Data Reviewed  Labs: ordered.  Radiology: ordered.    Risk  Prescription drug management.                                      Clinical Impression:  Final diagnoses:  [N39.0] Urinary tract infection without hematuria, site unspecified (Primary)          ED Disposition Condition    Discharge Stable          ED Prescriptions       Medication Sig Dispense Start Date End Date Auth. Provider    cefpodoxime (VANTIN) 100 MG tablet Take 1 tablet (100 mg total) by mouth 2 (two) times daily. for 7 days 14 tablet 10/19/2024 10/26/2024 Tone Bustamante NP    diclofenac (VOLTAREN) 50 MG EC tablet Take 1 tablet (50 mg total) by mouth 2 (two) times daily. for 5 days 10 tablet 10/19/2024 10/24/2024 Tone Bustamante NP    fluconazole (DIFLUCAN) 150 MG Tab (Expires today) Take 1 tablet (150 mg total) by mouth once daily. for 1 day 1 tablet 10/19/2024 10/20/2024 Tone Bustamante NP          Follow-up Information       Follow up With Specialties Details Why Contact Info    [facility] - Emergency Dept. Emergency Medicine Go  to  As needed, If symptoms worsen [id number] [facility] [id number] [phone]    Primary Care Provider  Schedule an appointment as soon as possible for a visit  As needed, If symptoms worsen              Tone Bustamante, NP  10/20/24 0238

## 2024-11-05 ENCOUNTER — PATIENT MESSAGE (OUTPATIENT)
Dept: RESEARCH | Facility: HOSPITAL | Age: 34
End: 2024-11-05
Payer: COMMERCIAL

## 2024-11-08 ENCOUNTER — OFFICE VISIT (OUTPATIENT)
Facility: CLINIC | Age: 34
End: 2024-11-08
Payer: COMMERCIAL

## 2024-11-08 VITALS
RESPIRATION RATE: 14 BRPM | WEIGHT: 135.25 LBS | SYSTOLIC BLOOD PRESSURE: 143 MMHG | HEIGHT: 63 IN | BODY MASS INDEX: 23.96 KG/M2 | HEART RATE: 72 BPM | TEMPERATURE: 99 F | DIASTOLIC BLOOD PRESSURE: 95 MMHG

## 2024-11-08 DIAGNOSIS — N39.0 RECURRENT UTI: ICD-10-CM

## 2024-11-08 DIAGNOSIS — R31.29 OTHER MICROSCOPIC HEMATURIA: ICD-10-CM

## 2024-11-08 DIAGNOSIS — N30.00 ACUTE CYSTITIS WITHOUT HEMATURIA: Primary | ICD-10-CM

## 2024-11-08 DIAGNOSIS — R30.0 DYSURIA: ICD-10-CM

## 2024-11-08 LAB
BILIRUB UR QL STRIP: NEGATIVE
GLUCOSE UR QL STRIP: NEGATIVE
KETONES UR QL STRIP: NEGATIVE
LEUKOCYTE ESTERASE UR QL STRIP: NEGATIVE
PH, POC UA: 5.5
POC BLOOD, URINE: NEGATIVE
POC NITRATES, URINE: NEGATIVE
POC RESIDUAL URINE VOLUME: 17 ML (ref 0–100)
PROT UR QL STRIP: NEGATIVE
SP GR UR STRIP: 1.03 (ref 1–1.03)
SPECIMEN SOURCE: NORMAL
SPECIMEN SOURCE: NORMAL
UROBILINOGEN UR STRIP-ACNC: 0.2 (ref 0.1–1.1)

## 2024-11-08 PROCEDURE — 87798 DETECT AGENT NOS DNA AMP: CPT | Performed by: UROLOGY

## 2024-11-08 PROCEDURE — 99999 PR PBB SHADOW E&M-EST. PATIENT-LVL IV: CPT | Mod: PBBFAC,,, | Performed by: UROLOGY

## 2024-11-08 PROCEDURE — 87563 M. GENITALIUM AMP PROBE: CPT | Performed by: UROLOGY

## 2024-11-08 PROCEDURE — 87798 DETECT AGENT NOS DNA AMP: CPT | Mod: 59 | Performed by: UROLOGY

## 2024-11-08 PROCEDURE — 87086 URINE CULTURE/COLONY COUNT: CPT | Performed by: UROLOGY

## 2024-11-08 RX ORDER — CIPROFLOXACIN 500 MG/1
500 TABLET ORAL 2 TIMES DAILY
Qty: 20 TABLET | Refills: 0 | Status: SHIPPED | OUTPATIENT
Start: 2024-11-08

## 2024-11-08 RX ORDER — FLUCONAZOLE 150 MG/1
150 TABLET ORAL SEE ADMIN INSTRUCTIONS
Qty: 3 TABLET | Refills: 1 | Status: SHIPPED | OUTPATIENT
Start: 2024-11-08 | End: 2024-11-09

## 2024-11-08 NOTE — PROGRESS NOTES
Subjective:       Patient ID: Bessy Geller is a 34 y.o. female.    Chief Complaint: Other Misc (Dysuria)      History of Present Illness:     Ms Geller says that she has a long history of recurrent UTIs per the patient.  She says in late Sept 2024 she took a 1 week course of macrobid for a UTI.  She says the UTI recurred and she was prescribed a 1 week course of cefpodoxime that was started on 10-19-24.  She says her symptoms improved with the cefpodoxime but her UTI symptoms have returned about a week ago.  She is having dysuria, urinary urgency, and urinary frequency.  No gross hematuria.  He is having suprapubic discomfort.  She says pyridium is not helping much with the dysuria.   She says she is  and these UTIs were not associated with intercourse.  No current flank pain.  No history of kidney stones.          Past Medical History:   Diagnosis Date    Amenorrhea     Migraines     PCOS (polycystic ovarian syndrome)      Family History   Problem Relation Name Age of Onset    Rheum arthritis Maternal Grandmother Megan Leonard     Osteoarthritis Maternal Grandmother Megan Leonard      Social History     Socioeconomic History    Marital status:    Tobacco Use    Smoking status: Former     Types: Vaping with nicotine    Smokeless tobacco: Never   Substance and Sexual Activity    Alcohol use: Not Currently    Drug use: Never    Sexual activity: Yes     Partners: Male     Birth control/protection: I.U.D.     Comment: would like IUD removal, discuss pre-conception     Social Drivers of Health     Financial Resource Strain: Low Risk  (3/6/2024)    Overall Financial Resource Strain (CARDIA)     Difficulty of Paying Living Expenses: Not very hard   Food Insecurity: No Food Insecurity (3/6/2024)    Hunger Vital Sign     Worried About Running Out of Food in the Last Year: Never true     Ran Out of Food in the Last Year: Never true   Transportation Needs: No Transportation Needs (3/6/2024)    PRAPARE -  Transportation     Lack of Transportation (Medical): No     Lack of Transportation (Non-Medical): No   Physical Activity: Insufficiently Active (3/6/2024)    Exercise Vital Sign     Days of Exercise per Week: 3 days     Minutes of Exercise per Session: 20 min   Stress: Stress Concern Present (3/6/2024)    [place] [employer] - Occupational Stress Questionnaire     Feeling of Stress : Very much   Housing Stability: Low Risk  (3/6/2024)    Housing Stability Vital Sign     Unable to Pay for Housing in the Last Year: No     Number of Places Lived in the Last Year: 1     Unstable Housing in the Last Year: No     Outpatient Encounter Medications as of 2024   Medication Sig Dispense Refill    butalbital-acetaminophen-caff -40 mg Cap       [] cefpodoxime (VANTIN) 100 MG tablet Take 1 tablet (100 mg total) by mouth 2 (two) times daily. for 7 days 14 tablet 0    ciprofloxacin HCl (CIPRO) 500 MG tablet Take 1 tablet (500 mg total) by mouth 2 (two) times a day. 20 tablet 0    [] diclofenac (VOLTAREN) 50 MG EC tablet Take 1 tablet (50 mg total) by mouth 2 (two) times daily. for 5 days 10 tablet 0    [] fluconazole (DIFLUCAN) 150 MG Tab Take 1 tablet (150 mg total) by mouth once daily. for 1 day 1 tablet 0    fluconazole (DIFLUCAN) 150 MG Tab Take 1 tablet (150 mg total) by mouth As instructed (Take 1 by mouth every other day as needed for a yeast infection). 3 tablet 1    ibuprofen (ADVIL,MOTRIN) 600 MG tablet Take 600 mg by mouth every 6 (six) hours as needed.      medroxyPROGESTERone (PROVERA) 10 MG tablet TAKE 1 TABLET(10 MG) BY MOUTH EVERY DAY FOR 14 DAYS 14 tablet 0    cleveland-m.blue-s.phos-phsal-hyo (URIBEL) 118-10-40.8-36 mg Cap Take 1 capsule by mouth As instructed (Take  by mouth every 8 hours as needed for burning with urination, bladder pain, and/or urinary urgency and frequency). 30 capsule 1    naproxen sodium (ANAPROX) 550 MG tablet Take 1 tablet (550 mg total) by  "mouth 3 (three) times daily with meals. 30 tablet 2    tiZANidine (ZANAFLEX) 4 MG tablet Take 2-4 mg by mouth every 8 (eight) hours as needed.       No facility-administered encounter medications on file as of 11/8/2024.        Review of Systems   Constitutional:  Negative for chills and fever.   Respiratory:  Negative for shortness of breath.    Cardiovascular:  Negative for chest pain.   Gastrointestinal:  Negative for nausea and vomiting.   Genitourinary:  Positive for dysuria, frequency and urgency. Negative for difficulty urinating and hematuria.   Musculoskeletal:  Negative for back pain.   Skin:  Negative for rash.   Neurological:  Negative for dizziness.   Psychiatric/Behavioral:  Negative for agitation.        Objective:     BP (!) 143/95 (BP Location: Left arm, Patient Position: Sitting)   Pulse 72   Temp 98.6 °F (37 °C) (Oral)   Resp 14   Ht 5' 3" (1.6 m)   Wt 61.4 kg (135 lb 4 oz)   LMP 08/01/2024 (Approximate)   BMI 23.96 kg/m²     Physical Exam  Constitutional:       General: She is not in acute distress.  Pulmonary:      Effort: Pulmonary effort is normal.   Abdominal:      General: There is no distension.      Palpations: Abdomen is soft.   Neurological:      Mental Status: She is alert and oriented to person, place, and time.         Office Visit on 11/08/2024   Component Date Value Ref Range Status    POC Residual Urine Volume 11/08/2024 17  0 - 100 mL Final    POC Blood, Urine 11/08/2024 Negative  Negative Final    POC Bilirubin, Urine 11/08/2024 Negative  Negative Final    POC Urobilinogen, Urine 11/08/2024 0.2  0.1 - 1.1 Final    POC Ketones, Urine 11/08/2024 Negative  Negative Final    POC Protein, Urine 11/08/2024 Negative  Negative Final    POC Nitrates, Urine 11/08/2024 Negative  Negative Final    POC Glucose, Urine 11/08/2024 Negative  Negative Final    pH, UA 11/08/2024 5.5   Final    POC Specific Gravity, Urine 11/08/2024 1.030 (A)  1.003 - 1.029 Final    POC Leukocytes, Urine " 11/08/2024 Negative  Negative Final        Results for orders placed or performed in visit on 11/08/24 (from the past 8760 hours)   POCT Bladder Scan   Result Value    POC Residual Urine Volume 17        Assessment:       1. Acute cystitis without hematuria    2. Recurrent UTI    3. Other microscopic hematuria    4. Dysuria      Plan:     Orders Placed This Encounter    CULTURE, URINE    Ureaplasma PCR Urine    Mycoplasma hominus Molecular detection PCR Urine    Mycoplasma genitalium Molecular Detection, PCR Urine    POCT Bladder Scan    POCT Urinalysis, Dipstick, Automated, W/O Scope    ciprofloxacin HCl (CIPRO) 500 MG tablet    fluconazole (DIFLUCAN) 150 MG Tab    methen-m.blue-s.phos-phsal-hyo (URIBEL) 118-10-40.8-36 mg Cap        10-19-24  CT abdomen/pelvis without IV contrast.  See report.  No obstructive uropathy or other acute finding.  Kidneys without hydronephrosis or sizable calcification.  Urinary bladder decompressed.    I reviewed the above imaging result.         10-19-24  Cr 1.1.  GFR >60.    9-19-22  Urine culture.  No growth.    I reviewed the above lab results.         Assessment:  - Cystitis.  UA today on 11-8-24 shows abel neg, nit neg, blood +1, clarity clear.  - Long history of recurrent UTIs per the patient.  She says in late Sept 2024 she took a 1 week course of macrobid for a UTI.  She says the UTI recurred and she was prescribed a 1 week course of cefpodoxime that was started on 10-19-24.  She says her symptoms improved with the cefpodoxime but her UTI symptoms have returned about a week ago.  - Dysuria.  She says pyridium is not helping much with the dysuria.    Plan:  - Urine culture and urine atypical culture today.  - Started ciprofloxacin 500 mg 1 PO BID X 10 days.  - I prescribed her fluconazole 150 mg 1 PO qod, disp #3, 1 refill today on 11-8-24.  - I prescribed her generic uribel 1 PO q 8 hours PRN dysuria, bladder pain, and/or urinary urgency and frequency), disp #30, 1 refill today  on 11-8-24.  - Cranberry tablet 1 PO BID.  - Probiotic 1 PO qdaily for at least 1 month.  - I discussed dietary modifications with her today and I recommended she drink mostly water during the day.   - RTC in 2 weeks with a PVR on arrival.

## 2024-11-09 LAB
BACTERIA UR CULT: NORMAL
BACTERIA UR CULT: NORMAL

## 2024-11-12 RX ORDER — HYOSCYAMINE SULFATE 0.12 MG/1
0.12 TABLET SUBLINGUAL EVERY 6 HOURS PRN
Qty: 40 TABLET | Refills: 1 | Status: SHIPPED | OUTPATIENT
Start: 2024-11-12

## 2024-11-18 ENCOUNTER — OFFICE VISIT (OUTPATIENT)
Facility: CLINIC | Age: 34
End: 2024-11-18
Payer: COMMERCIAL

## 2024-11-18 VITALS
WEIGHT: 135.38 LBS | HEART RATE: 92 BPM | RESPIRATION RATE: 16 BRPM | DIASTOLIC BLOOD PRESSURE: 85 MMHG | HEIGHT: 63 IN | BODY MASS INDEX: 23.99 KG/M2 | SYSTOLIC BLOOD PRESSURE: 121 MMHG

## 2024-11-18 DIAGNOSIS — R31.29 OTHER MICROSCOPIC HEMATURIA: ICD-10-CM

## 2024-11-18 DIAGNOSIS — N39.0 RECURRENT UTI: ICD-10-CM

## 2024-11-18 DIAGNOSIS — R30.0 DYSURIA: ICD-10-CM

## 2024-11-18 DIAGNOSIS — N30.00 ACUTE CYSTITIS WITHOUT HEMATURIA: Primary | ICD-10-CM

## 2024-11-18 LAB — POC RESIDUAL URINE VOLUME: 20 ML (ref 0–100)

## 2024-11-18 PROCEDURE — 3044F HG A1C LEVEL LT 7.0%: CPT | Mod: CPTII,S$GLB,, | Performed by: UROLOGY

## 2024-11-18 PROCEDURE — 87086 URINE CULTURE/COLONY COUNT: CPT | Performed by: UROLOGY

## 2024-11-18 PROCEDURE — 99999 PR PBB SHADOW E&M-EST. PATIENT-LVL III: CPT | Mod: PBBFAC,,, | Performed by: UROLOGY

## 2024-11-18 PROCEDURE — 51798 US URINE CAPACITY MEASURE: CPT | Mod: S$GLB,,, | Performed by: UROLOGY

## 2024-11-18 PROCEDURE — 3079F DIAST BP 80-89 MM HG: CPT | Mod: CPTII,S$GLB,, | Performed by: UROLOGY

## 2024-11-18 PROCEDURE — 1159F MED LIST DOCD IN RCRD: CPT | Mod: CPTII,S$GLB,, | Performed by: UROLOGY

## 2024-11-18 PROCEDURE — 3074F SYST BP LT 130 MM HG: CPT | Mod: CPTII,S$GLB,, | Performed by: UROLOGY

## 2024-11-18 PROCEDURE — 99214 OFFICE O/P EST MOD 30 MIN: CPT | Mod: S$GLB,,, | Performed by: UROLOGY

## 2024-11-18 PROCEDURE — 3008F BODY MASS INDEX DOCD: CPT | Mod: CPTII,S$GLB,, | Performed by: UROLOGY

## 2024-11-18 RX ORDER — NITROFURANTOIN 25; 75 MG/1; MG/1
100 CAPSULE ORAL SEE ADMIN INSTRUCTIONS
Qty: 90 CAPSULE | Refills: 1 | Status: SHIPPED | OUTPATIENT
Start: 2024-11-18

## 2024-11-18 NOTE — PROGRESS NOTES
Subjective:       Patient ID: Bessy Geller is a 34 y.o. female.    Chief Complaint: Follow-up      History of Present Illness:     Ms Geller has a long history of recurrent UTIs.  She said in late Sept 2024 she took a 1 week course of macrobid for a UTI.  She said the UTI recurred and she was prescribed a 1 week course of cefpodoxime that was started on 10-19-24.  Her symptoms improved with the cefpodoxime but her UTI symptoms have returned in early Nov 2024.  I prescribed her a 10 day course of ciprofloxacin starting on 11-8-24 which she took.  She has dysuria that is improving but has not resolved.  Generic uribel is working better than pyridium.  No other significant LUTS.  She has persistent microscopic hematuria.  No gross hematuria.  She has a tace amount of microscopic hematuria today on 11-18-24.  She had a small amount of MSH on 11-8-24.  She is not on her menstrual cycle at this time.  She says she has PCOS and her menstrual cycle is irregular.  Denies taking a blood thinner.  She quit smoking about 10 years ago.  She smoked for about 2 years.  She has been vaping for a few years.    Follow-up  Pertinent negatives include no chest pain, chills, fever, nausea, rash or vomiting.        Past Medical History:   Diagnosis Date    Amenorrhea     Migraines     PCOS (polycystic ovarian syndrome)      Family History   Problem Relation Name Age of Onset    No Known Problems Father      No Known Problems Mother      No Known Problems Maternal Aunt      No Known Problems Maternal Uncle      No Known Problems Paternal Aunt      No Known Problems Paternal Grandmother      No Known Problems Paternal Grandfather      Rheum arthritis Maternal Grandmother Megan Leonard     Osteoarthritis Maternal Grandmother Megan Leonard     No Known Problems Maternal Grandfather       Social History     Socioeconomic History    Marital status:    Tobacco Use    Smoking status: Former     Types: Vaping with nicotine     Smokeless tobacco: Never   Substance and Sexual Activity    Alcohol use: Not Currently    Drug use: Never    Sexual activity: Yes     Partners: Male     Social Drivers of Health     Financial Resource Strain: Low Risk  (3/6/2024)    Overall Financial Resource Strain (CARDIA)     Difficulty of Paying Living Expenses: Not very hard   Food Insecurity: No Food Insecurity (3/6/2024)    Hunger Vital Sign     Worried About Running Out of Food in the Last Year: Never true     Ran Out of Food in the Last Year: Never true   Transportation Needs: No Transportation Needs (3/6/2024)    PRAPARE - Transportation     Lack of Transportation (Medical): No     Lack of Transportation (Non-Medical): No   Physical Activity: Insufficiently Active (3/6/2024)    Exercise Vital Sign     Days of Exercise per Week: 3 days     Minutes of Exercise per Session: 20 min   Stress: Stress Concern Present (3/6/2024)    [employer] - Occupational Stress Questionnaire     Feeling of Stress : Very much   Housing Stability: Low Risk  (3/6/2024)    Housing Stability Vital Sign     Unable to Pay for Housing in the Last Year: No     Number of Places Lived in the Last Year: 1     Unstable Housing in the Last Year: No     Outpatient Encounter Medications as of 11/18/2024   Medication Sig Dispense Refill    butalbital-acetaminophen-caff -40 mg Cap       ciprofloxacin HCl (CIPRO) 500 MG tablet Take 1 tablet (500 mg total) by mouth 2 (two) times a day. 20 tablet 0    hyoscyamine 0.125 mg Subl Place 1 tablet (0.125 mg total) under the tongue every 6 (six) hours as needed (Bladder spasms). 40 tablet 1    ibuprofen (ADVIL,MOTRIN) 600 MG tablet Take 600 mg by mouth every 6 (six) hours as needed.      methen-m.blue-s.phos-phsal-hyo (URIBEL) 118-10-40.8-36 mg Cap Take 1 capsule by mouth As instructed (Take  by mouth every 8 hours as needed for burning with urination, bladder pain, and/or urinary urgency and frequency). 30 capsule 1     "naproxen sodium (ANAPROX) 550 MG tablet Take 1 tablet (550 mg total) by mouth 3 (three) times daily with meals. 30 tablet 2    tiZANidine (ZANAFLEX) 4 MG tablet Take 2-4 mg by mouth every 8 (eight) hours as needed.      [] fluconazole (DIFLUCAN) 150 MG Tab Take 1 tablet (150 mg total) by mouth As instructed (Take 1 by mouth every other day as needed for a yeast infection). 3 tablet 1    medroxyPROGESTERone (PROVERA) 10 MG tablet TAKE 1 TABLET(10 MG) BY MOUTH EVERY DAY FOR 14 DAYS 14 tablet 0    nitrofurantoin, macrocrystal-monohydrate, (MACROBID) 100 MG capsule Take 1 capsule (100 mg total) by mouth As instructed (Take 1 by mouth as needed within 1 hour of intercourse as needed for recurrent UTIs). 90 capsule 1     No facility-administered encounter medications on file as of 2024.        Review of Systems   Constitutional:  Negative for chills and fever.   Respiratory:  Negative for shortness of breath.    Cardiovascular:  Negative for chest pain.   Gastrointestinal:  Negative for nausea and vomiting.   Genitourinary:  Positive for dysuria. Negative for difficulty urinating.   Musculoskeletal:  Negative for back pain.   Skin:  Negative for rash.   Neurological:  Negative for dizziness.   Psychiatric/Behavioral:  Negative for agitation.        Objective:     /85 (BP Location: Left arm, Patient Position: Sitting)   Pulse 92   Resp 16   Ht 5' 3" (1.6 m)   Wt 61.4 kg (135 lb 5.8 oz)   LMP 2024 (Approximate)   BMI 23.98 kg/m²     Physical Exam  Constitutional:       General: She is not in acute distress.  Pulmonary:      Effort: Pulmonary effort is normal.   Abdominal:      General: There is no distension.      Palpations: Abdomen is soft.   Neurological:      Mental Status: She is alert and oriented to person, place, and time.         Office Visit on 2024   Component Date Value Ref Range Status    POC Residual Urine Volume 2024 20  0 - 100 mL Final        Results for orders " placed or performed in visit on 11/18/24 (from the past 8760 hours)   POCT Bladder Scan   Result Value    POC Residual Urine Volume 20        Assessment:       1. Acute cystitis without hematuria    2. Recurrent UTI    3. Dysuria    4. Other microscopic hematuria      Plan:     Orders Placed This Encounter    CULTURE, URINE    POCT Urinalysis, Dipstick, Automated, W/O Scope    POCT Bladder Scan    nitrofurantoin, macrocrystal-monohydrate, (MACROBID) 100 MG capsule           10-19-24  CT abdomen/pelvis without IV contrast.  See report.  No obstructive uropathy or other acute finding.  Kidneys without hydronephrosis or sizable calcification.  Urinary bladder decompressed.     I reviewed the above imaging result.            10-19-24  Cr 1.1.  GFR >60.    11-8-24  Urine culture.  Multiple organisms isolated. None in predominance.      9-19-22  Urine culture.  No growth.    11-8-24  Urine atypical culture.  Negative.     I reviewed the above lab results.            Assessment:  - Cystitis is improving.  - She said in late Sept 2024 she took a 1 week course of macrobid for a UTI.  She said the UTI recurred and she was prescribed a 1 week course of cefpodoxime that was started on 10-19-24.  Her symptoms improved with the cefpodoxime but her UTI symptoms have returned in early Nov 2024.  I prescribed her a 10 day course of ciprofloxacin starting on 11-8-24.    - Long history of recurrent UTIs per the patient.    - Dysuria that is improving but has not resolved.  Generic uribel is working better than pyridium.  - Persistent microscopic hematuria.  Trace amount of microscopic hematuria today on 11-18-24.  Small amount of MSH on 11-8-24.  She is not on her menstrual cycle at this time.  She says she has PCOS and her menstrual cycle is irregular.  Denies taking a blood thinner.  She quit smoking about 10 years ago.  She smoked for about 2 years.  She has been vaping for a few years.     Plan:  - Urine culture today.  - The  mutual decision was made to hold off on a cystoscopy for her persistent microscopic hematuria at this time.  Will plan to check a UA at her 6 month appointment.  - I prescribed her nitrofurantoin 100 mg, disp #90, 1 refill to take 1 PO PRN within 1 hour of intercourse as needed for recurrent UTIs today on 11-18-24.  - I prescribed her fluconazole 150 mg 1 PO qod, disp #3, 1 refill on 11-8-24.  She has not taken the fluconazole yet.  - I prescribed her generic uribel 1 PO q 8 hours PRN dysuria, bladder pain, and/or urinary urgency and frequency), disp #30, 1 refill on 11-8-24.  - Cranberry tablet 1 PO BID.  - D-mannose tablet 1 PO BID.  - Continue a probiotic 1 PO qdaily for another 1 month.  - I discussed dietary modifications with her today and I recommended she drink mostly water during the day.   - RTC in 6 months or sooner as needed.

## 2024-11-20 LAB — BACTERIA UR CULT: NO GROWTH

## 2024-11-21 ENCOUNTER — PATIENT MESSAGE (OUTPATIENT)
Facility: CLINIC | Age: 34
End: 2024-11-21
Payer: COMMERCIAL

## 2025-01-27 ENCOUNTER — TELEPHONE (OUTPATIENT)
Dept: PSYCHIATRY | Facility: CLINIC | Age: 35
End: 2025-01-27
Payer: COMMERCIAL

## 2025-01-27 NOTE — TELEPHONE ENCOUNTER
----- Message from Chaka Muniz sent at 1/27/2025 11:49 AM CST -----  Contact: Bessy  Type:  Needing Return Call    Who Called: Bessy  Needs Call Back For: Scheduling referral that was sent from PCP  Would the patient rather a call back or a response via [email or web address]?  Call  Best Call Back Number: [phone]  Additional Information:

## 2025-02-17 ENCOUNTER — OFFICE VISIT (OUTPATIENT)
Dept: INTERNAL MEDICINE | Facility: CLINIC | Age: 35
End: 2025-02-17
Payer: COMMERCIAL

## 2025-02-17 DIAGNOSIS — G43.809 OTHER MIGRAINE WITHOUT STATUS MIGRAINOSUS, NOT INTRACTABLE: ICD-10-CM

## 2025-02-17 DIAGNOSIS — R41.840 ATTENTION DEFICIT: Primary | ICD-10-CM

## 2025-02-17 PROCEDURE — 98002 SYNCH AUDIO-VIDEO NEW MOD 45: CPT | Mod: 95,,, | Performed by: INTERNAL MEDICINE

## 2025-02-17 RX ORDER — BUTALBITAL, ACETAMINOPHEN AND CAFFEINE 50; 325; 40 MG/1; MG/1; MG/1
1 TABLET ORAL DAILY PRN
Qty: 30 TABLET | Refills: 0 | Status: SHIPPED | OUTPATIENT
Start: 2025-02-17 | End: 2025-03-19

## 2025-02-17 RX ORDER — TOPIRAMATE 25 MG/1
25 TABLET ORAL 2 TIMES DAILY
Qty: 60 TABLET | Refills: 0 | Status: SHIPPED | OUTPATIENT
Start: 2025-02-17 | End: 2025-02-19

## 2025-02-17 NOTE — PROGRESS NOTES
Subjective:      Patient ID: Bessy Geller is a 34 y.o. female.    Chief Complaint: No chief complaint on file.    HPI    The patient location is: [place]  The chief complaint leading to consultation is: psyc eval, attention deficit, migraine headaches    Visit type:  Audiovisual    Face to Face time with patient: 15   minutes of total time spent on the encounter, which includes face to face time and non-face to face time preparing to see the patient (eg, review of tests), Obtaining and/or reviewing separately obtained history, Documenting clinical information in the electronic or other health record, Independently interpreting results (not separately reported) and communicating results to the patient/family/caregiver, or Care coordination (not separately reported).         Each patient to whom he or she provides medical services by telemedicine is:  (1) informed of the relationship between the physician and patient and the respective role of any other health care provider with respect to management of the patient; and (2) notified that he or she may decline to receive medical services by telemedicine and may withdraw from such care at any time.    Notes:     Presents today to discuss psyc eval, attention deficit symptoms, migraine headaches    35 yo with Problem List[1]  Past Medical History:   Diagnosis Date    Amenorrhea     Migraines     PCOS (polycystic ovarian syndrome)      Her today requesting referral to neuropsyc for ADD eval.   Pt reports had [facility]   She request referral to Dr. Lakhani.   Having more attention issues with increased demands at work and school.     She takes topamax 25 mg bid.   Fiorecet prn. Takes 2 to 3 times per week.   Did not like emgality nor ubrelvy.   Request refill of topamax and fiorecet until can see neuro.   She is considering botox        Review of Systems   Constitutional:   Negative for activity change and unexpected weight change.   HENT:  Negative for hearing loss, rhinorrhea and trouble swallowing.    Eyes:  Negative for discharge and visual disturbance.   Respiratory:  Negative for chest tightness and wheezing.    Cardiovascular:  Negative for chest pain and palpitations.   Gastrointestinal:  Negative for blood in stool, constipation, diarrhea and vomiting.   Endocrine: Negative for polydipsia and polyuria.   Genitourinary:  Negative for difficulty urinating, dysuria, hematuria and menstrual problem.   Musculoskeletal:  Positive for neck pain. Negative for arthralgias and joint swelling.   Neurological:  Positive for headaches. Negative for weakness.   Psychiatric/Behavioral:  Negative for confusion and dysphoric mood.      Objective:   There were no vitals taken for this visit.    Physical Exam  Constitutional:       General: She is awake.      Appearance: Normal appearance.   HENT:      Head: Normocephalic and atraumatic.   Eyes:      Conjunctiva/sclera: Conjunctivae normal.   Pulmonary:      Effort: Pulmonary effort is normal.   Musculoskeletal:      Cervical back: Normal range of motion.   Neurological:      Mental Status: She is alert. Mental status is at baseline.   Psychiatric:         Mood and Affect: Mood normal.         Behavior: Behavior normal. Behavior is cooperative.         Thought Content: Thought content normal.         Judgment: Judgment normal.         Lab Results   Component Value Date    WBC 12.71 (H) 10/19/2024    HGB 14.4 10/19/2024    HGB 14.4 03/08/2024    HCT 41.9 10/19/2024    MCV 85 10/19/2024    MCV 91 03/08/2024     10/19/2024    CHOL 173 03/08/2024    TRIG 63 03/08/2024    HDL 56 03/08/2024    LDLCALC 104.4 03/08/2024    ALT 11 10/19/2024    AST 18 10/19/2024     10/19/2024    K 3.1 (L) 10/19/2024    CALCIUM 9.7 10/19/2024     10/19/2024    CO2 19 (L) 10/19/2024    BUN 10 10/19/2024    CREATININE 1.1 10/19/2024    CREATININE 0.9  03/08/2024    EGFRNORACEVR >60 10/19/2024    EGFRNORACEVR >60.0 03/08/2024    TSH 1.001 03/08/2024    TSH 1.351 05/17/2023    GLU 96 10/19/2024    HGBA1C 5.0 03/08/2024          The ASCVD Risk score (Abner DK, et al., 2019) failed to calculate for the following reasons:    The 2019 ASCVD risk score is only valid for ages 40 to 79     Assessment:     1. Attention deficit    2. Other migraine without status migrainosus, not intractable      Plan:   1. Attention deficit  -     Ambulatory referral/consult to Psychiatry; Future; Expected date: 02/24/2025  -     Ambulatory referral/consult to Psychiatry; Future; Expected date: 02/24/2025  -     Ambulatory referral/consult to Psychiatry; Future; Expected date: 02/24/2025  -     Ambulatory referral/consult to Adult Neuropsychology; Future; Expected date: 02/17/2025    2. Other migraine without status migrainosus, not intractable  -     butalbital-acetaminophen-caffeine -40 mg (FIORICET, ESGIC) -40 mg per tablet; Take 1 tablet by mouth daily as needed for Headaches.  Dispense: 30 tablet; Refill: 0  -     Discontinue: topiramate (TOPAMAX) 25 MG tablet; Take 1 tablet (25 mg total) by mouth 2 (two) times daily.  Dispense: 60 tablet; Refill: 0  -     Ambulatory referral/consult to Neurology; Future; Expected date: 02/24/2025    Patient reports side effects from multiple medications in the past.  She is aware not to get pregnant during Topamax treatment    There are no Patient Instructions on file for this visit.    Future Appointments   Date Time Provider Department Center   3/3/2025  9:30 AM El Villasenor MD [facility] PSYCH [place]   4/10/2025 11:00 AM Sasha Gonzáles NP [facility] NEUR[facility] [place]   5/16/2025  3:00 PM Dung Trejo MD [facility] UROLOGY [facility]   6/4/2025  2:00 PM Concepcion Varela, PhD [facility] PS[facility]CH [facility]       Lab Frequency Next Occurrence   Ambulatory Referral/Consult to Lifestyle Nutrition Once 05/30/2024       Follow up if symptoms worsen or fail  to improve.                         [1]   Patient Active Problem List  Diagnosis    PCOS (polycystic ovarian syndrome)

## 2025-02-18 ENCOUNTER — PATIENT MESSAGE (OUTPATIENT)
Dept: PSYCHIATRY | Facility: CLINIC | Age: 35
End: 2025-02-18
Payer: COMMERCIAL

## 2025-02-18 ENCOUNTER — TELEPHONE (OUTPATIENT)
Dept: PSYCHIATRY | Facility: CLINIC | Age: 35
End: 2025-02-18
Payer: COMMERCIAL

## 2025-02-18 NOTE — TELEPHONE ENCOUNTER
Refill Routing Note   Medication(s) are not appropriate for processing by [facility] for the following reason(s):        Outside of protocol    [facility] action(s):  Route               Appointments  past 12m or future 3m with PCP    Date Provider   Last Visit   2/17/2025 Boogie Hair MD   Next Visit   Visit date not found Boogie Hair MD   ED visits in past 90 days: 0        Note composed:4:21 AM 02/18/2025

## 2025-02-19 RX ORDER — TOPIRAMATE 25 MG/1
25 TABLET ORAL 2 TIMES DAILY
Qty: 180 TABLET | Refills: 0 | Status: SHIPPED | OUTPATIENT
Start: 2025-02-19

## 2025-03-03 ENCOUNTER — PATIENT MESSAGE (OUTPATIENT)
Dept: PSYCHIATRY | Facility: CLINIC | Age: 35
End: 2025-03-03
Payer: COMMERCIAL

## 2025-03-03 ENCOUNTER — OFFICE VISIT (OUTPATIENT)
Dept: PSYCHIATRY | Facility: CLINIC | Age: 35
End: 2025-03-03
Payer: COMMERCIAL

## 2025-03-03 VITALS
DIASTOLIC BLOOD PRESSURE: 90 MMHG | SYSTOLIC BLOOD PRESSURE: 124 MMHG | WEIGHT: 129.44 LBS | HEART RATE: 111 BPM | BODY MASS INDEX: 22.92 KG/M2

## 2025-03-03 DIAGNOSIS — R41.840 ATTENTION DEFICIT: Primary | ICD-10-CM

## 2025-03-03 DIAGNOSIS — H93.293 MISOPHONIA: ICD-10-CM

## 2025-03-03 DIAGNOSIS — F43.29 ADJUSTMENT DISORDER WITH OTHER SYMPTOM: ICD-10-CM

## 2025-03-03 PROCEDURE — 3074F SYST BP LT 130 MM HG: CPT | Mod: CPTII,S$GLB,, | Performed by: PSYCHIATRY & NEUROLOGY

## 2025-03-03 PROCEDURE — 99999 PR PBB SHADOW E&M-EST. PATIENT-LVL III: CPT | Mod: PBBFAC,,, | Performed by: PSYCHIATRY & NEUROLOGY

## 2025-03-03 PROCEDURE — 3080F DIAST BP >= 90 MM HG: CPT | Mod: CPTII,S$GLB,, | Performed by: PSYCHIATRY & NEUROLOGY

## 2025-03-03 PROCEDURE — 3008F BODY MASS INDEX DOCD: CPT | Mod: CPTII,S$GLB,, | Performed by: PSYCHIATRY & NEUROLOGY

## 2025-03-03 PROCEDURE — 99215 OFFICE O/P EST HI 40 MIN: CPT | Mod: S$GLB,,, | Performed by: PSYCHIATRY & NEUROLOGY

## 2025-03-03 RX ORDER — BUPROPION HYDROCHLORIDE 150 MG/1
TABLET ORAL
Qty: 50 TABLET | Refills: 0 | Status: SHIPPED | OUTPATIENT
Start: 2025-03-03
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
## 2025-03-04 ENCOUNTER — PATIENT MESSAGE (OUTPATIENT)
Dept: PSYCHIATRY | Facility: CLINIC | Age: 35
End: 2025-03-04
Payer: COMMERCIAL

## 2025-03-04 PROBLEM — H93.293 MISOPHONIA: Status: ACTIVE | Noted: 2025-03-04

## 2025-03-04 PROBLEM — F43.20 ADJUSTMENT DISORDER: Status: ACTIVE | Noted: 2025-03-04

## 2025-03-04 PROBLEM — R41.840 ATTENTION DEFICIT: Status: ACTIVE | Noted: 2025-03-04

## 2025-03-04 NOTE — PATIENT INSTRUCTIONS

## 2025-03-07 ENCOUNTER — LAB VISIT (OUTPATIENT)
Dept: LAB | Facility: HOSPITAL | Age: 35
End: 2025-03-07
Attending: PSYCHIATRY & NEUROLOGY
Payer: COMMERCIAL

## 2025-03-07 DIAGNOSIS — H93.293 MISOPHONIA: ICD-10-CM

## 2025-03-07 DIAGNOSIS — R41.840 ATTENTION DEFICIT: ICD-10-CM

## 2025-03-07 PROCEDURE — 82607 VITAMIN B-12: CPT | Performed by: PSYCHIATRY & NEUROLOGY

## 2025-03-07 PROCEDURE — 36415 COLL VENOUS BLD VENIPUNCTURE: CPT | Performed by: PSYCHIATRY & NEUROLOGY

## 2025-03-07 PROCEDURE — 82746 ASSAY OF FOLIC ACID SERUM: CPT | Performed by: PSYCHIATRY & NEUROLOGY

## 2025-03-08 LAB
FOLATE SERPL-MCNC: 6.7 NG/ML (ref 4–24)
VIT B12 SERPL-MCNC: 826 PG/ML (ref 210–950)

## 2025-03-10 ENCOUNTER — RESULTS FOLLOW-UP (OUTPATIENT)
Dept: PSYCHIATRY | Facility: CLINIC | Age: 35
End: 2025-03-10

## 2025-03-11 ENCOUNTER — EVALUATION (OUTPATIENT)
Dept: PSYCHIATRY | Facility: CLINIC | Age: 35
End: 2025-03-11
Payer: COMMERCIAL

## 2025-03-11 DIAGNOSIS — R41.840 ATTENTION DEFICIT: Primary | ICD-10-CM

## 2025-03-11 DIAGNOSIS — F43.23 ADJUSTMENT DISORDER WITH MIXED ANXIETY AND DEPRESSED MOOD: ICD-10-CM

## 2025-03-11 PROCEDURE — 99999 PR PBB SHADOW E&M-EST. PATIENT-LVL III: CPT | Mod: PBBFAC,,, | Performed by: STUDENT IN AN ORGANIZED HEALTH CARE EDUCATION/TRAINING PROGRAM

## 2025-03-11 PROCEDURE — 96130 PSYCL TST EVAL PHYS/QHP 1ST: CPT | Mod: S$GLB,,, | Performed by: STUDENT IN AN ORGANIZED HEALTH CARE EDUCATION/TRAINING PROGRAM
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
## 2025-03-17 ENCOUNTER — OFFICE VISIT (OUTPATIENT)
Dept: PSYCHIATRY | Facility: CLINIC | Age: 35
End: 2025-03-17
Payer: COMMERCIAL

## 2025-03-17 DIAGNOSIS — R41.840 ATTENTION DEFICIT: ICD-10-CM

## 2025-03-17 DIAGNOSIS — F43.23 ADJUSTMENT DISORDER WITH MIXED ANXIETY AND DEPRESSED MOOD: Primary | ICD-10-CM

## 2025-03-17 DIAGNOSIS — H93.293 MISOPHONIA: ICD-10-CM

## 2025-03-17 PROCEDURE — 1159F MED LIST DOCD IN RCRD: CPT | Mod: CPTII,95,, | Performed by: PSYCHIATRY & NEUROLOGY

## 2025-03-17 PROCEDURE — 98006 SYNCH AUDIO-VIDEO EST MOD 30: CPT | Mod: 95,,, | Performed by: PSYCHIATRY & NEUROLOGY

## 2025-03-17 PROCEDURE — 1160F RVW MEDS BY RX/DR IN RCRD: CPT | Mod: CPTII,95,, | Performed by: PSYCHIATRY & NEUROLOGY

## 2025-03-17 RX ORDER — BUPROPION HYDROCHLORIDE 150 MG/1
150 TABLET ORAL 2 TIMES DAILY
Qty: 60 TABLET | Refills: 0 | Status: SHIPPED | OUTPATIENT
Start: 2025-03-17 | End: 2025-04-16
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
## 2025-04-10 ENCOUNTER — CLINICAL SUPPORT (OUTPATIENT)
Dept: PSYCHIATRY | Facility: CLINIC | Age: 35
End: 2025-04-10
Payer: COMMERCIAL

## 2025-04-10 DIAGNOSIS — R41.840 ATTENTION DEFICIT: ICD-10-CM

## 2025-04-10 DIAGNOSIS — F43.23 ADJUSTMENT DISORDER WITH MIXED ANXIETY AND DEPRESSED MOOD: ICD-10-CM

## 2025-04-10 PROCEDURE — 99999 PR PBB SHADOW E&M-EST. PATIENT-LVL I: CPT | Mod: PBBFAC,,,

## 2025-04-15 ENCOUNTER — PATIENT MESSAGE (OUTPATIENT)
Dept: PSYCHIATRY | Facility: CLINIC | Age: 35
End: 2025-04-15
Payer: COMMERCIAL

## 2025-04-15 ENCOUNTER — PATIENT MESSAGE (OUTPATIENT)
Dept: INTERNAL MEDICINE | Facility: CLINIC | Age: 35
End: 2025-04-15
Payer: COMMERCIAL

## 2025-04-15 NOTE — TELEPHONE ENCOUNTER
AVS discussed w/ parents including f/u, pain meds, resuming home meds, wound care, and when to call Dr Morrow's clinic. PIV removed and site CDI. No bleeding. Parents verbalized understanding. [facility]    Ret call and exp to pt that we ae not accepting outside referrals at this time.

## 2025-04-16 ENCOUNTER — OFFICE VISIT (OUTPATIENT)
Dept: INTERNAL MEDICINE | Facility: CLINIC | Age: 35
End: 2025-04-16
Payer: COMMERCIAL

## 2025-04-16 DIAGNOSIS — R41.840 ATTENTION DEFICIT: Primary | ICD-10-CM

## 2025-04-16 DIAGNOSIS — G43.809 OTHER MIGRAINE WITHOUT STATUS MIGRAINOSUS, NOT INTRACTABLE: ICD-10-CM

## 2025-04-16 DIAGNOSIS — Z34.90 PREGNANCY, UNSPECIFIED GESTATIONAL AGE: ICD-10-CM

## 2025-04-16 PROCEDURE — 98005 SYNCH AUDIO-VIDEO EST LOW 20: CPT | Mod: 95,,, | Performed by: INTERNAL MEDICINE

## 2025-04-16 NOTE — PROGRESS NOTES
Subjective:      Patient ID: Bessy Geller is a 34 y.o. female.    Chief Complaint: No chief complaint on file.    HPI  History of Present Illness                   Nausea yesterday. Had secondary amenorrhea . Pos stress test.     Imatrex - ineffective  Nortryptyline - ineffective   Ubrelvy - weight gain  Nurtec - mamta gain.       Review of Systems  Objective:   There were no vitals taken for this visit.    Physical Exam    Lab Results   Component Value Date    WBC 12.71 (H) 10/19/2024    HGB 14.4 10/19/2024    HGB 14.4 03/08/2024    HCT 41.9 10/19/2024    MCV 85 10/19/2024    MCV 91 03/08/2024     10/19/2024    CHOL 173 03/08/2024    TRIG 63 03/08/2024    HDL 56 03/08/2024    LDLCALC 104.4 03/08/2024    ALT 11 10/19/2024    AST 18 10/19/2024     10/19/2024    K 3.1 (L) 10/19/2024    CALCIUM 9.7 10/19/2024     10/19/2024    CO2 19 (L) 10/19/2024    BUN 10 10/19/2024    CREATININE 1.1 10/19/2024    CREATININE 0.9 03/08/2024    EGFRNORACEVR >60 10/19/2024    EGFRNORACEVR >60.0 03/08/2024    TSH 1.001 03/08/2024    TSH 1.351 05/17/2023    GLU 96 10/19/2024    HGBA1C 5.0 03/08/2024          The ASCVD Risk score (Johnston DK, et al., 2019) failed to calculate for the following reasons:    The 2019 ASCVD risk score is only valid for ages 40 to 79     Assessment:     1. Attention deficit    2. Other migraine without status migrainosus, not intractable      Plan:   1. Attention deficit    2. Other migraine without status migrainosus, not intractable        Patient Instructions   Stop taking Topamax      Stay off of topamax until while pregnant and breast feeding. If migraines return ask OB if propranolol, atenolol, metoprolol, nadolol, venlafaxine, or amitripyline ok to use.         Future Appointments   Date Time Provider Department Center   5/6/2025  7:30 AM MAIN ULTRASOUND, [facility] [place] [facility]   5/6/2025  8:30 AM Karrie Kim MD [facility] [place] [facility]   5/8/2025  8:00 AM Concepcion Varela,  PhD [facility]   5/16/2025  3:00 PM Dung Trejo MD [facility] UROLOG[facility]   7/7/2025  3:00 PM Marshall Tracy LCSW [facility] [place]   7/10/2025  9:00 AM Marshall Tracy LCSW  PSYCH [place]       Lab Frequency Next Occurrence   Ambulatory Referral/Consult to Lifestyle Nutrition Once 05/30/2024   Ambulatory referral/consult to Psychiatry Once 02/24/2025   Ambulatory referral/consult to Neurology Once 02/24/2025   Ambulatory referral/consult to Adult Neuropsychology Once 02/17/2025   Ambulatory referral/consult to Behavioral Health Once 03/18/2025       Follow up if symptoms worsen or fail to improve.

## 2025-04-17 ENCOUNTER — OFFICE VISIT (OUTPATIENT)
Dept: PSYCHIATRY | Facility: CLINIC | Age: 35
End: 2025-04-17
Payer: COMMERCIAL

## 2025-04-17 DIAGNOSIS — R41.840 ATTENTION DEFICIT: Primary | ICD-10-CM

## 2025-04-17 DIAGNOSIS — H93.293 MISOPHONIA: ICD-10-CM

## 2025-04-17 DIAGNOSIS — F43.23 ADJUSTMENT DISORDER WITH MIXED ANXIETY AND DEPRESSED MOOD: ICD-10-CM

## 2025-04-17 PROCEDURE — 99999 PR PBB SHADOW E&M-EST. PATIENT-LVL II: CPT | Mod: PBBFAC,,, | Performed by: PSYCHIATRY & NEUROLOGY

## 2025-04-17 PROCEDURE — 99214 OFFICE O/P EST MOD 30 MIN: CPT | Mod: S$GLB,,, | Performed by: PSYCHIATRY & NEUROLOGY

## 2025-04-17 PROCEDURE — 1159F MED LIST DOCD IN RCRD: CPT | Mod: CPTII,S$GLB,, | Performed by: PSYCHIATRY & NEUROLOGY

## 2025-04-17 PROCEDURE — 1160F RVW MEDS BY RX/DR IN RCRD: CPT | Mod: CPTII,S$GLB,, | Performed by: PSYCHIATRY & NEUROLOGY
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
## 2025-04-17 NOTE — PATIENT INSTRUCTIONS

## 2025-04-28 NOTE — PROGRESS NOTES
Subjective:      Patient ID: Bessy Geller is a 34 y.o. female.    Chief Complaint: No chief complaint on file.    HPI  History of Present Illness               The patient location is: [place]  The chief complaint leading to consultation is: pregnancy     Visit type:  Audiovisual     Face to Face time with patient: 15   minutes of total time spent on the encounter, which includes face to face time and non-face to face time preparing to see the patient (eg, review of tests), Obtaining and/or reviewing separately obtained history, Documenting clinical information in the electronic or other health record, Independently interpreting results (not separately reported) and communicating results to the patient/family/caregiver, or Care coordination (not separately reported).            Each patient to whom he or she provides medical services by telemedicine is:  (1) informed of the relationship between the physician and patient and the respective role of any other health care provider with respect to management of the patient; and (2) notified that he or she may decline to receive medical services by telemedicine and may withdraw from such care at any time.     Notes:      Presents today to discuss pregnancy.      Nausea yesterday. Had secondary amenorrhea . Pos stress test.      Has long h/o Migraine HAs with SEs to mult medications.   Imatrex - ineffective  Nortryptyline - ineffective   Ubrelvy - weight gain  Nurtec - mamta gain.        Nausea yesterday. Had secondary amenorrhea . Pos stress test.     Imatrex - ineffective  Nortryptyline - ineffective   Ubrelvy - weight gain  Nurtec - mamta gain.       Review of Systems   Constitutional:  Negative for activity change and unexpected weight change.   HENT:  Negative for hearing loss, rhinorrhea and trouble swallowing.    Eyes:  Negative for discharge and visual disturbance.   Respiratory:  Negative for chest tightness and wheezing.    Cardiovascular:  Negative for  chest pain and palpitations.   Gastrointestinal:  Positive for diarrhea and vomiting. Negative for blood in stool and constipation.   Endocrine: Negative for polydipsia and polyuria.   Genitourinary:  Negative for difficulty urinating, dysuria, hematuria and menstrual problem.   Musculoskeletal:  Negative for arthralgias, joint swelling and neck pain.   Neurological:  Negative for weakness and headaches.   Psychiatric/Behavioral:  Negative for confusion and dysphoric mood.      Objective:   There were no vitals taken for this visit.    Physical Exam  Constitutional:       General: She is awake.      Appearance: Normal appearance.   HENT:      Head: Normocephalic and atraumatic.   Eyes:      Conjunctiva/sclera: Conjunctivae normal.   Pulmonary:      Effort: Pulmonary effort is normal.   Musculoskeletal:      Cervical back: Normal range of motion.   Neurological:      Mental Status: She is alert. Mental status is at baseline.   Psychiatric:         Mood and Affect: Mood normal.         Behavior: Behavior normal. Behavior is cooperative.         Thought Content: Thought content normal.         Judgment: Judgment normal.         Lab Results   Component Value Date    WBC 12.71 (H) 10/19/2024    HGB 14.4 10/19/2024    HGB 14.4 03/08/2024    HCT 41.9 10/19/2024    MCV 85 10/19/2024    MCV 91 03/08/2024     10/19/2024    CHOL 173 03/08/2024    TRIG 63 03/08/2024    HDL 56 03/08/2024    LDLCALC 104.4 03/08/2024    ALT 11 10/19/2024    AST 18 10/19/2024     10/19/2024    K 3.1 (L) 10/19/2024    CALCIUM 9.7 10/19/2024     10/19/2024    CO2 19 (L) 10/19/2024    BUN 10 10/19/2024    CREATININE 1.1 10/19/2024    CREATININE 0.9 03/08/2024    EGFRNORACEVR >60 10/19/2024    EGFRNORACEVR >60.0 03/08/2024    TSH 1.001 03/08/2024    TSH 1.351 05/17/2023    GLU 96 10/19/2024    HGBA1C 5.0 03/08/2024          The ASCVD Risk score (Abner DK, et al., 2019) failed to calculate for the following reasons:    The 2019 ASCVD  risk score is only valid for ages 40 to 79     Assessment:     1. Attention deficit    2. Other migraine without status migrainosus, not intractable    3. Pregnancy, unspecified gestational age      Plan:   1. Attention deficit    2. Other migraine without status migrainosus, not intractable    3. Pregnancy, unspecified gestational age        Patient Instructions   Stop taking Topamax      Stay off of topamax while pregnant and breast feeding. If migraines return ask OB if propranolol, atenolol, metoprolol, nadolol, venlafaxine, or amitripyline ok to use.         Future Appointments   Date Time Provider Department Center   5/6/2025  7:30 AM MAIN ULTRASOUND, [facility]   5/6/2025  8:30 AM Karrie Kim MD [facility] OBGYN [facility]   5/8/2025  8:00 AM Concepcion Varela, PhD [facility] PSYCH [facility]   5/16/2025  3:00 PM Dung Trejo MD [facility] UROLOGY [facility]   7/7/2025  3:00 PM Marshall Tracy LCSW  PSYCH [place]   7/10/2025  9:00 AM Marshall Tracy LCSW [facility] PSYCH [place]       Lab Frequency Next Occurrence   Ambulatory Referral/Consult to Lifestyle Nutrition Once 05/30/2024   Ambulatory referral/consult to Psychiatry Once 02/24/2025   Ambulatory referral/consult to Neurology Once 02/24/2025   Ambulatory referral/consult to Adult Neuropsychology Once 02/17/2025   Ambulatory referral/consult to Behavioral Health Once 03/18/2025       Follow up if symptoms worsen or fail to improve.

## 2025-05-08 ENCOUNTER — OFFICE VISIT (OUTPATIENT)
Dept: PSYCHIATRY | Facility: CLINIC | Age: 35
End: 2025-05-08
Payer: COMMERCIAL

## 2025-05-08 ENCOUNTER — PATIENT MESSAGE (OUTPATIENT)
Dept: PSYCHIATRY | Facility: CLINIC | Age: 35
End: 2025-05-08
Payer: COMMERCIAL

## 2025-05-08 DIAGNOSIS — F90.2 ADHD (ATTENTION DEFICIT HYPERACTIVITY DISORDER), COMBINED TYPE: Primary | ICD-10-CM

## 2025-05-09 ENCOUNTER — PATIENT MESSAGE (OUTPATIENT)
Dept: PSYCHIATRY | Facility: CLINIC | Age: 35
End: 2025-05-09
Payer: COMMERCIAL

## 2025-05-09 NOTE — PROGRESS NOTES
[facility] - Psychiatry  Psychology  Progress Note  Psychological Testing Results Session (PhD/LCSW)    Patient Name: Bessy Geller  MRN: [id number]    Patient Class: [facility]- [facility]  Primary Care Provider: Yaya Malcolm Jr., MD    Psychiatry Visit (PhD/LCSW)  Individual Psychotherapy - CPT 18094    Date: 5/8/2025    Site: Telemed    The patient location is: Patient's home/ Patient reported that his/her location at the time of this visit was in the [facility]     Visit type: Virtual visit with synchronous audio and video     Each patient to whom he or she provides medical services by telemedicine is: (1) informed of the relationship between the physician and patient and the respective role of any other health care provider with respect to management of the patient; and (2) notified that he or she may decline to receive medical services by telemedicine and may withdraw from such care at any time.     Referral source: Javi Villasenor MD    Clinical status of patient: Outpatient    Bessy Geller, a 34 y.o. female, for resulting visit.  Met with patient.    Chief complaint/reason for encounter: Psychological Evaluation and treatment recommendations    Summary: Results of recent testing indicate that Bessy's overall intellectual functioning falls within the average range. All index scores--including Verbal Comprehension, Visual Spatial, Fluid Reasoning, Working Memory, and Processing Speed--were within the average range. However, there were statistically significant differences between her Working Memory and Visual Spatial scores, as well as between her Working Memory and Fluid Reasoning scores. These discrepancies highlight a relative weakness in Working Memory.    In terms of academic performance, Bessy scored in the average range for Broad Reading and Broad Written Language. Her Broad Mathematics score fell in the low average range, with specific difficulty noted in calculation and math  fluency tasks.    On a computerized performance test, Bessy demonstrated a liberal response style, meaning she prioritized speed over accuracy. She made more commission and perseverative errors than average, responded more quickly, and showed high variability in her response speed. These results strongly suggest difficulties with inattention, impulsivity, and vigilance.    On a self-report measure of attention, all areas were elevated. Bessy reported particular difficulty sustaining and shifting attention, maintaining alertness, and regulating effort and processing speed. Additionally, on a measure of mood and personality, she presented as introverted and preferred solitary activities.    Based on the integration of all available data, Bessy meets criteria for Attention-Deficit/Hyperactivity Disorder (ADHD), Combined Presentation.    Diagnostic Impression - Plan:       ICD-10-CM ICD-9-CM   1. ADHD (attention deficit hyperactivity disorder), combined type  F90.2 314.01       Plan:individual psychotherapy and consult psychiatrist for medication evaluation    Return to Clinic: as needed    Length of Service (minutes): 30          Concepcion Varela, PhD  Psychologist  [facility] - Psychiatry

## 2025-05-13 ENCOUNTER — OFFICE VISIT (OUTPATIENT)
Dept: PSYCHIATRY | Facility: CLINIC | Age: 35
End: 2025-05-13
Payer: COMMERCIAL

## 2025-05-13 DIAGNOSIS — F90.2 ADHD (ATTENTION DEFICIT HYPERACTIVITY DISORDER), COMBINED TYPE: ICD-10-CM

## 2025-05-13 DIAGNOSIS — F43.21 ADJUSTMENT DISORDER WITH DEPRESSED MOOD: Primary | ICD-10-CM

## 2025-05-13 DIAGNOSIS — H93.293 MISOPHONIA: ICD-10-CM

## 2025-05-13 PROCEDURE — 1160F RVW MEDS BY RX/DR IN RCRD: CPT | Mod: CPTII,95,, | Performed by: PSYCHIATRY & NEUROLOGY

## 2025-05-13 PROCEDURE — 1159F MED LIST DOCD IN RCRD: CPT | Mod: CPTII,95,, | Performed by: PSYCHIATRY & NEUROLOGY

## 2025-05-13 PROCEDURE — 98006 SYNCH AUDIO-VIDEO EST MOD 30: CPT | Mod: 95,,, | Performed by: PSYCHIATRY & NEUROLOGY

## 2025-05-13 RX ORDER — BUPROPION HYDROCHLORIDE 150 MG/1
150 TABLET ORAL 2 TIMES DAILY
Qty: 180 TABLET | Refills: 0 | Status: SHIPPED | OUTPATIENT
Start: 2025-05-13 | End: 2025-08-11

## 2025-05-13 RX ORDER — TRAZODONE HYDROCHLORIDE 50 MG/1
25-50 TABLET ORAL NIGHTLY
Qty: 30 EACH | Refills: 0 | Status: SHIPPED | OUTPATIENT
Start: 2025-05-13 | End: 2025-06-12

## 2025-05-13 NOTE — PATIENT INSTRUCTIONS
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
## 2025-06-23 ENCOUNTER — HOSPITAL ENCOUNTER (OUTPATIENT)
Dept: RADIOLOGY | Facility: HOSPITAL | Age: 35
Discharge: HOME | End: 2025-06-23
Attending: PODIATRIST
Payer: COMMERCIAL

## 2025-06-23 ENCOUNTER — OFFICE VISIT (OUTPATIENT)
Dept: PODIATRY | Facility: CLINIC | Age: 35
End: 2025-06-23
Payer: COMMERCIAL

## 2025-06-23 DIAGNOSIS — M77.8 CAPSULITIS OF FOOT, LEFT: Primary | ICD-10-CM

## 2025-06-23 DIAGNOSIS — M77.8 CAPSULITIS OF FOOT, LEFT: ICD-10-CM

## 2025-06-23 DIAGNOSIS — M21.622 TAILOR'S BUNIONETTE, LEFT: ICD-10-CM

## 2025-06-23 DIAGNOSIS — M77.52 BURSITIS OF LEFT FOOT: ICD-10-CM

## 2025-06-23 PROCEDURE — 1159F MED LIST DOCD IN RCRD: CPT | Mod: CPTII,S$GLB,, | Performed by: PODIATRIST

## 2025-06-23 PROCEDURE — 1160F RVW MEDS BY RX/DR IN RCRD: CPT | Mod: CPTII,S$GLB,, | Performed by: PODIATRIST

## 2025-06-23 PROCEDURE — 73630 X-RAY EXAM OF FOOT: CPT | Mod: TC,LT

## 2025-06-23 PROCEDURE — 73630 X-RAY EXAM OF FOOT: CPT | Mod: 26,LT,, | Performed by: RADIOLOGY

## 2025-06-23 PROCEDURE — 20600 DRAIN/INJ JOINT/BURSA W/O US: CPT | Mod: LT,S$GLB,, | Performed by: PODIATRIST

## 2025-06-23 PROCEDURE — 99999 PR PBB SHADOW E&M-EST. PATIENT-LVL III: CPT | Mod: PBBFAC,,, | Performed by: PODIATRIST

## 2025-06-23 PROCEDURE — 99213 OFFICE O/P EST LOW 20 MIN: CPT | Mod: 25,S$GLB,, | Performed by: PODIATRIST

## 2025-06-23 RX ORDER — TRIAMCINOLONE ACETONIDE 40 MG/ML
40 INJECTION, SUSPENSION INTRA-ARTICULAR; INTRAMUSCULAR
Status: DISCONTINUED | OUTPATIENT
Start: 2025-06-23 | End: 2025-06-23 | Stop reason: HOSPADM

## 2025-06-23 RX ADMIN — TRIAMCINOLONE ACETONIDE 40 MG: 40 INJECTION, SUSPENSION INTRA-ARTICULAR; INTRAMUSCULAR at 08:06

## 2025-06-23 NOTE — PROGRESS NOTES
Subjective:       Patient ID: Bessy Geller is a 34 y.o. female.    Chief Complaint: Foot Pain (C/o left foot pain , pt states the pain at the bottom on the lateral side, pt rates pain 6/10, pt is non-diabetic)      HPI: Bessy Geller presents to the office today to the outside of the left foot.  States she has had a similar symptoms in the past at which point she had a small hairline fracture to her 5th metatarsal bone.  States she did have some evidence of bursitis which was treated with a cortical steroid injection into the 5th metatarsophalangeal joint.  States no recent trauma or injury.  Ambulating with a 6/10 pain currently.Patient's Primary Care Provider is Yaya Malcolm Jr., MD.     Review of patient's allergies indicates:   Allergen Reactions    Cockroach     Mold        Past Medical History:   Diagnosis Date    Migraines     Miscarriage 05/03/2025    partial molar    Partial molar pregnancy 05/03/2025    PCOS (polycystic ovarian syndrome)        Family History   Problem Relation Name Age of Onset    No Known Problems Paternal Grandfather      No Known Problems Paternal Grandmother      Rheum arthritis Maternal Grandmother Megan Leonard     Osteoarthritis Maternal Grandmother Megan Leonard     Colon cancer Maternal Grandmother Megan Leonard     No Known Problems Maternal Grandfather      No Known Problems Father      No Known Problems Mother      No Known Problems Maternal Aunt      No Known Problems Maternal Uncle      No Known Problems Paternal Aunt         Social History[1]    Past Surgical History:   Procedure Laterality Date    REMOVAL OF INTRAUTERINE DEVICE (IUD)  12/28/2022    iud - unable to be removed during ultrasound    WISDOM TOOTH EXTRACTION         Review of Systems      Objective:   There were no vitals taken for this visit.    CT Abdomen Pelvis  Without Contrast  Narrative: EXAMINATION:  CT ABDOMEN PELVIS WITHOUT CONTRAST    CLINICAL HISTORY:  Flank pain, kidney stone  suspected;    TECHNIQUE:  Low dose axial images, sagittal and coronal reformations were obtained from the lung bases to the pubic symphysis.  Contrast was not administered.    COMPARISON:  None    FINDINGS:  Kidneys without hydronephrosis or sizable calcification.    Urinary bladder decompressed    Unenhanced liver pancreas and spleen unremarkable.    No signs of appendicitis or obstructive bowel findings  Impression: No obstructive uropathy or other acute finding    Electronically signed by: Sarah Ibarra  Date:    10/19/2024  Time:    21:54      Physical Exam    LOWER EXTREMITY PHYSICAL EXAMINATION    DERMATOLOGY: Skin is supple, dry and intact. No ecchymosis is noted. No hypertrophic skin formation. No erythema or cellulitis is noted.     VASCULAR:   The left dorsalis pedis pulse 2/4 and posterior tibial pulse on the left is 2/4.  Capillary refill is intact.  Pedal hair growth intact    ORTHOPEDIC:  Pain with range of motion of the 5th metatarsophalangeal joint.  No significant swelling lateral to the 5th MPJ.  No crepitus with range motion.  No interdigital neuroma formation appreciated.     Assessment:     1. Capsulitis of foot, left    2. Bursitis of left foot    3. Tailor's bunionette, left      Plan:     Capsulitis of foot, left  -     X-Ray Foot Complete Left; Future; Expected date: 06/23/2025    Bursitis of left foot  -     X-Ray Foot Complete Left; Future; Expected date: 06/23/2025  -     Small Joint Aspiration/Injection: L small MTP    Tailor's bunionette, left    Other orders  -     Cancel: Small Joint Aspiration/Injection  -     Cancel: Neuroma injection      Thorough discussion is had with the patient today, concerning the diagnosis, its etiology, and the treatment algorithm at present.    Order x-rays to rule out additional fracture or injury.  No evidence of acute fracture or injury noted on preliminary x-rays read.  Patient does have a tailor's bunion deformity present to the left foot.  The  intermetatarsal angle is 11° with in 11° lateral bowing angle.  We discuss management regarding conservative and surgical options for this.  We discuss padding, wide toe box shoes, anti-inflammatories, cortical steroid injection into the bursa, and surgical intervention.  Patient relates interested in an injection as this is done well in the past.    Future Appointments   Date Time Provider Department Center   7/18/2025 11:30 AM El Villasenor MD [facility] [place]             [1]   Social History  Socioeconomic History    Marital status:    Tobacco Use    Smoking status: Former     Types: Vaping with nicotine    Smokeless tobacco: Never   Substance and Sexual Activity    Alcohol use: Not Currently    Drug use: Never    Sexual activity: Yes     Partners: Male     Birth control/protection: None     Social Drivers of Health     Financial Resource Strain: Low Risk  (4/16/2025)    Overall Financial Resource Strain (CARDIA)     Difficulty of Paying Living Expenses: Not hard at all   Food Insecurity: No Food Insecurity (4/16/2025)    Hunger Vital Sign     Worried About Running Out of Food in the Last Year: Never true     Ran Out of Food in the Last Year: Never true   Transportation Needs: No Transportation Needs (4/16/2025)    PRAPARE - Transportation     Lack of Transportation (Medical): No     Lack of Transportation (Non-Medical): No   Physical Activity: Insufficiently Active (4/16/2025)    Exercise Vital Sign     Days of Exercise per Week: 3 days     Minutes of Exercise per Session: 20 min   Stress: No Stress Concern Present (4/16/2025)    [employer] - Occupational Stress Questionnaire     Feeling of Stress : Only a little   Housing Stability: Low Risk  (4/16/2025)    Housing Stability Vital Sign     Unable to Pay for Housing in the Last Year: No     Number of Times Moved in the Last Year: 0     Homeless in the Last Year: No

## 2025-06-23 NOTE — PROCEDURES
Small Joint Aspiration/Injection: L small MTP    Date/Time: 6/23/2025 8:15 AM    Performed by: Padmini Grajeda DPM  Authorized by: Padmini Grajeda DPM    Indications:  Pain, joint swelling and diagnostic evaluation  Site marked: the procedure site was marked    Timeout: prior to procedure the correct patient, procedure, and site was verified    Prep: patient was prepped and draped in usual sterile fashion      Local anesthesia used?: Yes    Anesthesia:  Local infiltration  Location:  Small toe  Site:  L small MTP (5th MPJ bursa)  Needle size:  25 G  Approach:  Dorsal  Medications:  40 mg triamcinolone acetonide 40 mg/mL  Patient tolerance:  Patient tolerated the procedure well with no immediate complications
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
## 2025-07-18 ENCOUNTER — HOSPITAL ENCOUNTER (OUTPATIENT)
Dept: CARDIOLOGY | Facility: HOSPITAL | Age: 35
Discharge: HOME | End: 2025-07-18
Attending: PSYCHIATRY & NEUROLOGY
Payer: COMMERCIAL

## 2025-07-18 ENCOUNTER — OFFICE VISIT (OUTPATIENT)
Dept: PSYCHIATRY | Facility: CLINIC | Age: 35
End: 2025-07-18
Payer: COMMERCIAL

## 2025-07-18 DIAGNOSIS — H93.293 MISOPHONIA: ICD-10-CM

## 2025-07-18 DIAGNOSIS — F90.2 ADHD (ATTENTION DEFICIT HYPERACTIVITY DISORDER), COMBINED TYPE: ICD-10-CM

## 2025-07-18 DIAGNOSIS — F51.04 PSYCHOPHYSIOLOGICAL INSOMNIA: ICD-10-CM

## 2025-07-18 DIAGNOSIS — F90.2 ADHD (ATTENTION DEFICIT HYPERACTIVITY DISORDER), COMBINED TYPE: Primary | ICD-10-CM

## 2025-07-18 DIAGNOSIS — F43.23 ADJUSTMENT DISORDER WITH MIXED ANXIETY AND DEPRESSED MOOD: ICD-10-CM

## 2025-07-18 LAB
OHS QRS DURATION: 76 MS
OHS QTC CALCULATION: 419 MS

## 2025-07-18 PROCEDURE — 93005 ELECTROCARDIOGRAM TRACING: CPT

## 2025-07-18 PROCEDURE — 93010 ELECTROCARDIOGRAM REPORT: CPT | Mod: ,,, | Performed by: INTERNAL MEDICINE

## 2025-07-18 RX ORDER — TRAZODONE HYDROCHLORIDE 50 MG/1
25-50 TABLET ORAL NIGHTLY
Qty: 30 EACH | Refills: 0 | Status: SHIPPED | OUTPATIENT
Start: 2025-07-18 | End: 2025-08-17

## 2025-07-18 RX ORDER — BUPROPION HYDROCHLORIDE 150 MG/1
150 TABLET ORAL 2 TIMES DAILY
Qty: 180 TABLET | Refills: 0 | Status: SHIPPED | OUTPATIENT
Start: 2025-07-18 | End: 2025-10-16

## 2025-07-18 NOTE — PATIENT INSTRUCTIONS

## 2025-07-20 LAB
OHS QRS DURATION: 76 MS
OHS QTC CALCULATION: 419 MS

## 2025-08-19 ENCOUNTER — OFFICE VISIT (OUTPATIENT)
Dept: PSYCHIATRY | Facility: CLINIC | Age: 35
End: 2025-08-19
Payer: COMMERCIAL

## 2025-08-19 VITALS
SYSTOLIC BLOOD PRESSURE: 126 MMHG | WEIGHT: 131.81 LBS | HEART RATE: 80 BPM | DIASTOLIC BLOOD PRESSURE: 89 MMHG | BODY MASS INDEX: 23.35 KG/M2

## 2025-08-19 DIAGNOSIS — F90.2 ADHD (ATTENTION DEFICIT HYPERACTIVITY DISORDER), COMBINED TYPE: Primary | ICD-10-CM

## 2025-08-19 DIAGNOSIS — H93.293 MISOPHONIA: ICD-10-CM

## 2025-08-19 DIAGNOSIS — F51.04 PSYCHOPHYSIOLOGICAL INSOMNIA: ICD-10-CM

## 2025-08-19 PROCEDURE — 3079F DIAST BP 80-89 MM HG: CPT | Mod: CPTII,S$GLB,, | Performed by: PSYCHIATRY & NEUROLOGY

## 2025-08-19 PROCEDURE — 3008F BODY MASS INDEX DOCD: CPT | Mod: CPTII,S$GLB,, | Performed by: PSYCHIATRY & NEUROLOGY

## 2025-08-19 PROCEDURE — 1159F MED LIST DOCD IN RCRD: CPT | Mod: CPTII,S$GLB,, | Performed by: PSYCHIATRY & NEUROLOGY

## 2025-08-19 PROCEDURE — 99214 OFFICE O/P EST MOD 30 MIN: CPT | Mod: S$GLB,,, | Performed by: PSYCHIATRY & NEUROLOGY

## 2025-08-19 PROCEDURE — 1160F RVW MEDS BY RX/DR IN RCRD: CPT | Mod: CPTII,S$GLB,, | Performed by: PSYCHIATRY & NEUROLOGY

## 2025-08-19 PROCEDURE — 3074F SYST BP LT 130 MM HG: CPT | Mod: CPTII,S$GLB,, | Performed by: PSYCHIATRY & NEUROLOGY

## 2025-08-19 PROCEDURE — 99999 PR PBB SHADOW E&M-EST. PATIENT-LVL II: CPT | Mod: PBBFAC,,, | Performed by: PSYCHIATRY & NEUROLOGY

## 2025-08-19 RX ORDER — DEXTROAMPHETAMINE SACCHARATE, AMPHETAMINE ASPARTATE MONOHYDRATE, DEXTROAMPHETAMINE SULFATE AND AMPHETAMINE SULFATE 2.5; 2.5; 2.5; 2.5 MG/1; MG/1; MG/1; MG/1
10 CAPSULE, EXTENDED RELEASE ORAL DAILY
Qty: 30 CAPSULE | Refills: 0 | Status: SHIPPED | OUTPATIENT
Start: 2025-08-19 | End: 2025-09-18